# Patient Record
Sex: FEMALE | Race: WHITE | Employment: UNEMPLOYED | URBAN - NONMETROPOLITAN AREA
[De-identification: names, ages, dates, MRNs, and addresses within clinical notes are randomized per-mention and may not be internally consistent; named-entity substitution may affect disease eponyms.]

---

## 2017-02-17 ENCOUNTER — HOSPITAL ENCOUNTER (OUTPATIENT)
Dept: PHARMACY | Age: 27
Setting detail: THERAPIES SERIES
Discharge: HOME | End: 2017-02-17
Payer: MEDICARE

## 2017-02-17 DIAGNOSIS — Z95.811 LVAD (LEFT VENTRICULAR ASSIST DEVICE) PRESENT (HCC): ICD-10-CM

## 2017-02-17 LAB — INR BLD: 2.6

## 2017-02-17 PROCEDURE — G0463 HOSPITAL OUTPT CLINIC VISIT: HCPCS

## 2017-02-17 PROCEDURE — 36416 COLLJ CAPILLARY BLOOD SPEC: CPT

## 2017-02-17 PROCEDURE — 85610 PROTHROMBIN TIME: CPT

## 2017-02-25 ENCOUNTER — HOSPITAL ENCOUNTER (OUTPATIENT)
Dept: PHARMACY | Age: 27
Setting detail: THERAPIES SERIES
Discharge: HOME | End: 2017-02-25
Payer: MEDICARE

## 2017-02-25 DIAGNOSIS — Z95.811 LVAD (LEFT VENTRICULAR ASSIST DEVICE) PRESENT (HCC): ICD-10-CM

## 2017-02-25 LAB — INR BLD: 3.9

## 2017-02-25 PROCEDURE — 36416 COLLJ CAPILLARY BLOOD SPEC: CPT

## 2017-02-25 PROCEDURE — G0463 HOSPITAL OUTPT CLINIC VISIT: HCPCS

## 2017-02-25 PROCEDURE — 85610 PROTHROMBIN TIME: CPT

## 2017-03-02 ENCOUNTER — APPOINTMENT (OUTPATIENT)
Dept: PHARMACY | Age: 27
End: 2017-03-02
Payer: MEDICARE

## 2017-03-02 ENCOUNTER — HOSPITAL ENCOUNTER (OUTPATIENT)
Dept: PHARMACY | Age: 27
Setting detail: THERAPIES SERIES
Discharge: HOME | End: 2017-03-02
Payer: MEDICARE

## 2017-03-02 DIAGNOSIS — Z95.811 LVAD (LEFT VENTRICULAR ASSIST DEVICE) PRESENT (HCC): ICD-10-CM

## 2017-03-02 LAB — INR BLD: 2.4

## 2017-03-02 PROCEDURE — G0463 HOSPITAL OUTPT CLINIC VISIT: HCPCS

## 2017-03-02 PROCEDURE — 36416 COLLJ CAPILLARY BLOOD SPEC: CPT

## 2017-03-02 PROCEDURE — 85610 PROTHROMBIN TIME: CPT

## 2017-03-23 ENCOUNTER — TELEPHONE (OUTPATIENT)
Dept: PHARMACY | Age: 27
End: 2017-03-23

## 2017-03-28 DIAGNOSIS — Z86.711 HISTORY OF PULMONARY EMBOLUS (PE): ICD-10-CM

## 2017-03-28 RX ORDER — WARFARIN SODIUM 4 MG/1
TABLET ORAL
Qty: 60 TABLET | Refills: 1 | OUTPATIENT
Start: 2017-03-28

## 2017-03-29 ENCOUNTER — HOSPITAL ENCOUNTER (OUTPATIENT)
Dept: PHARMACY | Age: 27
Setting detail: THERAPIES SERIES
Discharge: HOME | End: 2017-03-29
Payer: MEDICARE

## 2017-03-29 DIAGNOSIS — Z95.811 LVAD (LEFT VENTRICULAR ASSIST DEVICE) PRESENT (HCC): ICD-10-CM

## 2017-03-29 LAB — INR BLD: 1.4

## 2017-03-29 PROCEDURE — 85610 PROTHROMBIN TIME: CPT

## 2017-03-29 PROCEDURE — G0463 HOSPITAL OUTPT CLINIC VISIT: HCPCS

## 2017-03-29 PROCEDURE — 36416 COLLJ CAPILLARY BLOOD SPEC: CPT

## 2017-04-03 ENCOUNTER — TELEPHONE (OUTPATIENT)
Dept: FAMILY MEDICINE CLINIC | Age: 27
End: 2017-04-03

## 2017-04-12 ENCOUNTER — TELEPHONE (OUTPATIENT)
Dept: PHARMACY | Age: 27
End: 2017-04-12

## 2017-04-13 ENCOUNTER — HOSPITAL ENCOUNTER (OUTPATIENT)
Dept: PHARMACY | Age: 27
Setting detail: THERAPIES SERIES
Discharge: HOME | End: 2017-04-13
Payer: MEDICARE

## 2017-04-13 DIAGNOSIS — Z95.811 LVAD (LEFT VENTRICULAR ASSIST DEVICE) PRESENT (HCC): ICD-10-CM

## 2017-04-13 LAB — INR BLD: 1.9

## 2017-04-13 PROCEDURE — G0463 HOSPITAL OUTPT CLINIC VISIT: HCPCS

## 2017-04-13 PROCEDURE — 85610 PROTHROMBIN TIME: CPT

## 2017-04-13 PROCEDURE — 36416 COLLJ CAPILLARY BLOOD SPEC: CPT

## 2017-04-20 ENCOUNTER — HOSPITAL ENCOUNTER (OUTPATIENT)
Dept: PHARMACY | Age: 27
Setting detail: THERAPIES SERIES
Discharge: HOME | End: 2017-04-20
Payer: MEDICARE

## 2017-04-20 DIAGNOSIS — Z95.811 LVAD (LEFT VENTRICULAR ASSIST DEVICE) PRESENT (HCC): ICD-10-CM

## 2017-04-20 LAB — INR BLD: 3.5

## 2017-04-20 PROCEDURE — 85610 PROTHROMBIN TIME: CPT

## 2017-04-20 PROCEDURE — G0463 HOSPITAL OUTPT CLINIC VISIT: HCPCS

## 2017-04-20 PROCEDURE — 36416 COLLJ CAPILLARY BLOOD SPEC: CPT

## 2017-04-25 ENCOUNTER — HOSPITAL ENCOUNTER (OUTPATIENT)
Dept: PHARMACY | Age: 27
Setting detail: THERAPIES SERIES
Discharge: HOME | End: 2017-04-25
Payer: MEDICARE

## 2017-04-25 DIAGNOSIS — Z95.811 LVAD (LEFT VENTRICULAR ASSIST DEVICE) PRESENT (HCC): ICD-10-CM

## 2017-04-25 LAB — INR BLD: 3.1

## 2017-04-25 PROCEDURE — 36416 COLLJ CAPILLARY BLOOD SPEC: CPT

## 2017-04-25 PROCEDURE — G0463 HOSPITAL OUTPT CLINIC VISIT: HCPCS

## 2017-04-25 PROCEDURE — 85610 PROTHROMBIN TIME: CPT

## 2017-04-27 RX ORDER — NORTRIPTYLINE HYDROCHLORIDE 10 MG/1
CAPSULE ORAL
Qty: 30 CAPSULE | Refills: 0 | OUTPATIENT
Start: 2017-04-27

## 2017-05-02 ENCOUNTER — HOSPITAL ENCOUNTER (OUTPATIENT)
Dept: PHARMACY | Age: 27
Setting detail: THERAPIES SERIES
Discharge: HOME | End: 2017-05-02
Payer: MEDICARE

## 2017-05-02 DIAGNOSIS — F32.A DEPRESSION, UNSPECIFIED DEPRESSION TYPE: Primary | ICD-10-CM

## 2017-05-02 DIAGNOSIS — Z95.811 LVAD (LEFT VENTRICULAR ASSIST DEVICE) PRESENT (HCC): ICD-10-CM

## 2017-05-02 LAB — INR BLD: 1.3

## 2017-05-02 PROCEDURE — 85610 PROTHROMBIN TIME: CPT

## 2017-05-02 PROCEDURE — 99211 OFF/OP EST MAY X REQ PHY/QHP: CPT

## 2017-05-02 PROCEDURE — 36416 COLLJ CAPILLARY BLOOD SPEC: CPT

## 2017-05-02 RX ORDER — NORTRIPTYLINE HYDROCHLORIDE 25 MG/1
25 CAPSULE ORAL NIGHTLY
Qty: 30 CAPSULE | Refills: 0 | Status: SHIPPED | OUTPATIENT
Start: 2017-05-02 | End: 2018-05-21 | Stop reason: SDUPTHER

## 2017-05-10 ENCOUNTER — TELEPHONE (OUTPATIENT)
Dept: PHARMACY | Age: 27
End: 2017-05-10

## 2017-05-10 ENCOUNTER — HOSPITAL ENCOUNTER (OUTPATIENT)
Dept: PHARMACY | Age: 27
Discharge: HOME | End: 2017-05-10

## 2017-05-10 LAB — INR BLD: 2

## 2017-05-18 ENCOUNTER — HOSPITAL ENCOUNTER (OUTPATIENT)
Dept: PHARMACY | Age: 27
Setting detail: THERAPIES SERIES
Discharge: HOME | End: 2017-05-18
Payer: MEDICARE

## 2017-05-18 DIAGNOSIS — Z95.811 LVAD (LEFT VENTRICULAR ASSIST DEVICE) PRESENT (HCC): ICD-10-CM

## 2017-05-18 LAB — INR BLD: 1.7

## 2017-05-18 PROCEDURE — 36416 COLLJ CAPILLARY BLOOD SPEC: CPT

## 2017-05-18 PROCEDURE — 85610 PROTHROMBIN TIME: CPT

## 2017-05-18 PROCEDURE — 99211 OFF/OP EST MAY X REQ PHY/QHP: CPT

## 2017-05-31 ENCOUNTER — HOSPITAL ENCOUNTER (OUTPATIENT)
Dept: PHARMACY | Age: 27
Setting detail: THERAPIES SERIES
Discharge: HOME | End: 2017-05-31
Payer: MEDICARE

## 2017-05-31 LAB — INR BLD: 1.3

## 2017-05-31 PROCEDURE — 36416 COLLJ CAPILLARY BLOOD SPEC: CPT

## 2017-05-31 PROCEDURE — 85610 PROTHROMBIN TIME: CPT

## 2017-05-31 PROCEDURE — 99211 OFF/OP EST MAY X REQ PHY/QHP: CPT

## 2017-06-06 ENCOUNTER — HOSPITAL ENCOUNTER (OUTPATIENT)
Dept: PHARMACY | Age: 27
Setting detail: THERAPIES SERIES
Discharge: HOME | End: 2017-06-06
Payer: MEDICARE

## 2017-06-06 LAB — INR BLD: 2.7

## 2017-06-06 PROCEDURE — 36416 COLLJ CAPILLARY BLOOD SPEC: CPT

## 2017-06-06 PROCEDURE — 99211 OFF/OP EST MAY X REQ PHY/QHP: CPT

## 2017-06-06 PROCEDURE — 85610 PROTHROMBIN TIME: CPT

## 2017-06-14 DIAGNOSIS — Z95.811 LVAD (LEFT VENTRICULAR ASSIST DEVICE) PRESENT (HCC): ICD-10-CM

## 2017-06-14 RX ORDER — WARFARIN SODIUM 4 MG/1
TABLET ORAL
Qty: 60 TABLET | Refills: 1 | OUTPATIENT
Start: 2017-06-14

## 2017-06-16 ENCOUNTER — HOSPITAL ENCOUNTER (OUTPATIENT)
Dept: PHARMACY | Age: 27
Setting detail: THERAPIES SERIES
Discharge: HOME | End: 2017-06-16
Payer: MEDICARE

## 2017-06-16 DIAGNOSIS — Z95.811 LVAD (LEFT VENTRICULAR ASSIST DEVICE) PRESENT (HCC): ICD-10-CM

## 2017-06-16 LAB — INR BLD: 2

## 2017-06-16 PROCEDURE — 99211 OFF/OP EST MAY X REQ PHY/QHP: CPT

## 2017-06-16 PROCEDURE — 36416 COLLJ CAPILLARY BLOOD SPEC: CPT

## 2017-06-16 PROCEDURE — 85610 PROTHROMBIN TIME: CPT

## 2017-06-22 ENCOUNTER — HOSPITAL ENCOUNTER (OUTPATIENT)
Dept: PHARMACY | Age: 27
Setting detail: THERAPIES SERIES
Discharge: HOME | End: 2017-06-22
Payer: MEDICARE

## 2017-06-22 DIAGNOSIS — Z95.811 LVAD (LEFT VENTRICULAR ASSIST DEVICE) PRESENT (HCC): ICD-10-CM

## 2017-06-22 LAB — INR BLD: 3.1

## 2017-06-22 PROCEDURE — 99211 OFF/OP EST MAY X REQ PHY/QHP: CPT

## 2017-06-22 PROCEDURE — 36416 COLLJ CAPILLARY BLOOD SPEC: CPT

## 2017-06-22 PROCEDURE — 85610 PROTHROMBIN TIME: CPT

## 2017-07-31 DIAGNOSIS — Z20.818 STREP THROAT EXPOSURE: Primary | ICD-10-CM

## 2017-07-31 RX ORDER — AMOXICILLIN 500 MG/1
500 CAPSULE ORAL 3 TIMES DAILY
Qty: 30 CAPSULE | Refills: 0 | Status: SHIPPED | OUTPATIENT
Start: 2017-07-31 | End: 2017-08-10

## 2017-08-01 ENCOUNTER — TELEPHONE (OUTPATIENT)
Dept: FAMILY MEDICINE CLINIC | Age: 27
End: 2017-08-01

## 2017-08-01 ENCOUNTER — TELEPHONE (OUTPATIENT)
Dept: PHARMACY | Age: 27
End: 2017-08-01

## 2017-08-03 ENCOUNTER — HOSPITAL ENCOUNTER (OUTPATIENT)
Dept: PHARMACY | Age: 27
Setting detail: THERAPIES SERIES
Discharge: HOME | End: 2017-08-03
Payer: MEDICARE

## 2017-08-03 DIAGNOSIS — Z95.811 LVAD (LEFT VENTRICULAR ASSIST DEVICE) PRESENT (HCC): ICD-10-CM

## 2017-08-03 LAB — INR BLD: 1.3

## 2017-08-03 PROCEDURE — 36416 COLLJ CAPILLARY BLOOD SPEC: CPT

## 2017-08-03 PROCEDURE — 85610 PROTHROMBIN TIME: CPT

## 2017-08-03 PROCEDURE — 99211 OFF/OP EST MAY X REQ PHY/QHP: CPT

## 2017-08-11 ENCOUNTER — TELEPHONE (OUTPATIENT)
Dept: FAMILY MEDICINE CLINIC | Age: 27
End: 2017-08-11

## 2017-08-17 DIAGNOSIS — Z95.811 LVAD (LEFT VENTRICULAR ASSIST DEVICE) PRESENT (HCC): ICD-10-CM

## 2017-08-17 RX ORDER — WARFARIN SODIUM 4 MG/1
TABLET ORAL
Qty: 60 TABLET | Refills: 1 | Status: SHIPPED | OUTPATIENT
Start: 2017-08-17 | End: 2018-08-29 | Stop reason: SDUPTHER

## 2017-08-29 ENCOUNTER — APPOINTMENT (OUTPATIENT)
Dept: GENERAL RADIOLOGY | Age: 27
End: 2017-08-29
Payer: MEDICARE

## 2017-08-29 ENCOUNTER — HOSPITAL ENCOUNTER (EMERGENCY)
Age: 27
Discharge: HOME | End: 2017-08-29
Attending: EMERGENCY MEDICINE
Payer: MEDICARE

## 2017-08-29 VITALS
RESPIRATION RATE: 12 BRPM | BODY MASS INDEX: 19.57 KG/M2 | OXYGEN SATURATION: 96 % | TEMPERATURE: 97.9 F | DIASTOLIC BLOOD PRESSURE: 60 MMHG | SYSTOLIC BLOOD PRESSURE: 96 MMHG | HEART RATE: 100 BPM | WEIGHT: 107 LBS

## 2017-08-29 DIAGNOSIS — Z45.018 ENCOUNTER FOR INTERROGATION OF CARDIAC PACEMAKER: Primary | ICD-10-CM

## 2017-08-29 LAB
ABSOLUTE EOS #: 0.1 K/UL (ref 0–0.4)
ABSOLUTE LYMPH #: 1.2 K/UL (ref 1–4.8)
ABSOLUTE MONO #: 0.3 K/UL (ref 0.1–1.2)
ANION GAP SERPL CALCULATED.3IONS-SCNC: 12 MMOL/L (ref 9–17)
BASOPHILS # BLD: 1 %
BASOPHILS ABSOLUTE: 0 K/UL (ref 0–0.2)
BUN BLDV-MCNC: 9 MG/DL (ref 6–20)
BUN/CREAT BLD: 20 (ref 9–20)
CALCIUM SERPL-MCNC: 9.5 MG/DL (ref 8.6–10.4)
CHLORIDE BLD-SCNC: 103 MMOL/L (ref 98–107)
CO2: 27 MMOL/L (ref 20–31)
CREAT SERPL-MCNC: 0.45 MG/DL (ref 0.5–0.9)
DIFFERENTIAL TYPE: ABNORMAL
EKG ATRIAL RATE: 100 BPM
EKG P-R INTERVAL: 208 MS
EKG Q-T INTERVAL: 394 MS
EKG QRS DURATION: 178 MS
EKG QTC CALCULATION (BAZETT): 508 MS
EKG R AXIS: -153 DEGREES
EKG T AXIS: 60 DEGREES
EKG VENTRICULAR RATE: 100 BPM
EOSINOPHILS RELATIVE PERCENT: 1 %
GFR AFRICAN AMERICAN: >60 ML/MIN
GFR NON-AFRICAN AMERICAN: >60 ML/MIN
GFR SERPL CREATININE-BSD FRML MDRD: ABNORMAL ML/MIN/{1.73_M2}
GFR SERPL CREATININE-BSD FRML MDRD: ABNORMAL ML/MIN/{1.73_M2}
GLUCOSE BLD-MCNC: 93 MG/DL (ref 70–99)
HCT VFR BLD CALC: 39.3 % (ref 36–46)
HEMOGLOBIN: 12.9 G/DL (ref 12–16)
INR BLD: 1.2
LYMPHOCYTES # BLD: 26 %
MCH RBC QN AUTO: 28.7 PG (ref 26–34)
MCHC RBC AUTO-ENTMCNC: 32.7 G/DL (ref 31–37)
MCV RBC AUTO: 87.6 FL (ref 80–100)
MONOCYTES # BLD: 8 %
PDW BLD-RTO: 14.6 % (ref 11–14.5)
PLATELET # BLD: 123 K/UL (ref 140–450)
PLATELET ESTIMATE: ABNORMAL
PMV BLD AUTO: 8.4 FL (ref 6–12)
POTASSIUM SERPL-SCNC: 4 MMOL/L (ref 3.7–5.3)
PROTHROMBIN TIME: 12.9 SEC (ref 9.4–11.3)
RBC # BLD: 4.48 M/UL (ref 4–5.2)
RBC # BLD: ABNORMAL 10*6/UL
SEG NEUTROPHILS: 64 %
SEGMENTED NEUTROPHILS ABSOLUTE COUNT: 2.9 K/UL (ref 1.8–7.7)
SODIUM BLD-SCNC: 142 MMOL/L (ref 135–144)
WBC # BLD: 4.5 K/UL (ref 3.5–11)
WBC # BLD: ABNORMAL 10*3/UL

## 2017-08-29 PROCEDURE — 85025 COMPLETE CBC W/AUTO DIFF WBC: CPT

## 2017-08-29 PROCEDURE — 36415 COLL VENOUS BLD VENIPUNCTURE: CPT

## 2017-08-29 PROCEDURE — 99284 EMERGENCY DEPT VISIT MOD MDM: CPT

## 2017-08-29 PROCEDURE — 85610 PROTHROMBIN TIME: CPT

## 2017-08-29 PROCEDURE — 80048 BASIC METABOLIC PNL TOTAL CA: CPT

## 2017-08-29 PROCEDURE — 71020 XR CHEST STANDARD TWO VW: CPT

## 2017-08-29 PROCEDURE — 93005 ELECTROCARDIOGRAM TRACING: CPT

## 2017-08-29 ASSESSMENT — ENCOUNTER SYMPTOMS
SHORTNESS OF BREATH: 0
CONSTIPATION: 0
COUGH: 0
DIARRHEA: 0
BACK PAIN: 0
EYE PAIN: 0
BLOOD IN STOOL: 0
ABDOMINAL PAIN: 0
VOMITING: 0
NAUSEA: 0

## 2017-10-13 DIAGNOSIS — I49.01 VENTRICULAR FIBRILLATION (HCC): ICD-10-CM

## 2017-10-13 DIAGNOSIS — I47.20 VENTRICULAR TACHYCARDIA: Chronic | ICD-10-CM

## 2017-10-13 DIAGNOSIS — I51.9: ICD-10-CM

## 2017-10-13 DIAGNOSIS — Z95.810 AUTOMATIC IMPLANTABLE CARDIOVERTER-DEFIBRILLATOR IN SITU: Primary | ICD-10-CM

## 2017-10-13 DIAGNOSIS — Z95.810 AUTOMATIC IMPLANTABLE CARDIOVERTER-DEFIBRILLATOR IN SITU: ICD-10-CM

## 2017-10-13 DIAGNOSIS — Q21.3 TOF (TETRALOGY OF FALLOT): Chronic | ICD-10-CM

## 2017-10-30 ENCOUNTER — TELEPHONE (OUTPATIENT)
Dept: FAMILY MEDICINE CLINIC | Age: 27
End: 2017-10-30

## 2017-10-30 DIAGNOSIS — R11.10 VOMITING, INTRACTABILITY OF VOMITING NOT SPECIFIED, PRESENCE OF NAUSEA NOT SPECIFIED, UNSPECIFIED VOMITING TYPE: Primary | ICD-10-CM

## 2017-10-30 RX ORDER — ONDANSETRON 8 MG/1
8 TABLET, ORALLY DISINTEGRATING ORAL EVERY 8 HOURS PRN
Qty: 20 TABLET | Refills: 0 | Status: CANCELLED | OUTPATIENT
Start: 2017-10-30

## 2017-10-30 RX ORDER — ONDANSETRON 4 MG/1
4 TABLET, ORALLY DISINTEGRATING ORAL EVERY 8 HOURS PRN
Qty: 10 TABLET | Refills: 0 | Status: SHIPPED | OUTPATIENT
Start: 2017-10-30 | End: 2020-06-15

## 2017-10-30 NOTE — TELEPHONE ENCOUNTER
Pt calling stating that she had her defibrillator   replaced 10/23/17 at [facility]. Pt is c/o nausea for the past four days. Pt also states feels hot and cold. Pt denies SOB and chest pain except for the pain at the incision site. Pt is requesting Zofran. Pt uses [facility]. Please call pt at [phone] and advise.

## 2017-10-30 NOTE — TELEPHONE ENCOUNTER
Patient states just got of [facility] yesterday. Was using zofran there. Tried to get hold of doctor at [facility] but he nevers get with them on same day. Asking if you would reconsider. Doesnt feel like coming in.

## 2017-11-15 ENCOUNTER — TELEPHONE (OUTPATIENT)
Dept: PHARMACY | Age: 27
End: 2017-11-15

## 2017-11-16 ENCOUNTER — OFFICE VISIT (OUTPATIENT)
Dept: PRIMARY CARE CLINIC | Age: 27
End: 2017-11-16
Payer: MEDICARE

## 2017-11-16 VITALS
BODY MASS INDEX: 19.73 KG/M2 | TEMPERATURE: 99 F | SYSTOLIC BLOOD PRESSURE: 120 MMHG | HEART RATE: 103 BPM | OXYGEN SATURATION: 99 % | HEIGHT: 62 IN | RESPIRATION RATE: 12 BRPM | DIASTOLIC BLOOD PRESSURE: 60 MMHG | WEIGHT: 107.2 LBS

## 2017-11-16 DIAGNOSIS — J01.90 ACUTE BACTERIAL SINUSITIS: Primary | ICD-10-CM

## 2017-11-16 DIAGNOSIS — J30.9 ALLERGIC RHINITIS, UNSPECIFIED CHRONICITY, UNSPECIFIED SEASONALITY, UNSPECIFIED TRIGGER: ICD-10-CM

## 2017-11-16 DIAGNOSIS — J02.9 SORE THROAT: ICD-10-CM

## 2017-11-16 DIAGNOSIS — B96.89 ACUTE BACTERIAL SINUSITIS: Primary | ICD-10-CM

## 2017-11-16 DIAGNOSIS — R50.9 FEVER, UNSPECIFIED FEVER CAUSE: ICD-10-CM

## 2017-11-16 LAB
INFLUENZA A ANTIBODY: NEGATIVE
INFLUENZA B ANTIBODY: NEGATIVE
S PYO AG THROAT QL: NORMAL

## 2017-11-16 PROCEDURE — G8420 CALC BMI NORM PARAMETERS: HCPCS | Performed by: NURSE PRACTITIONER

## 2017-11-16 PROCEDURE — G8598 ASA/ANTIPLAT THER USED: HCPCS | Performed by: NURSE PRACTITIONER

## 2017-11-16 PROCEDURE — 99213 OFFICE O/P EST LOW 20 MIN: CPT | Performed by: NURSE PRACTITIONER

## 2017-11-16 PROCEDURE — 1036F TOBACCO NON-USER: CPT | Performed by: NURSE PRACTITIONER

## 2017-11-16 PROCEDURE — 87880 STREP A ASSAY W/OPTIC: CPT | Performed by: NURSE PRACTITIONER

## 2017-11-16 PROCEDURE — G8484 FLU IMMUNIZE NO ADMIN: HCPCS | Performed by: NURSE PRACTITIONER

## 2017-11-16 PROCEDURE — G8427 DOCREV CUR MEDS BY ELIG CLIN: HCPCS | Performed by: NURSE PRACTITIONER

## 2017-11-16 PROCEDURE — 87804 INFLUENZA ASSAY W/OPTIC: CPT | Performed by: NURSE PRACTITIONER

## 2017-11-16 RX ORDER — AMOXICILLIN AND CLAVULANATE POTASSIUM 875; 125 MG/1; MG/1
1 TABLET, FILM COATED ORAL 2 TIMES DAILY
Qty: 20 TABLET | Refills: 0 | Status: SHIPPED | OUTPATIENT
Start: 2017-11-16 | End: 2017-11-26

## 2017-11-16 RX ORDER — FLUTICASONE PROPIONATE 50 MCG
2 SPRAY, SUSPENSION (ML) NASAL DAILY
Qty: 1 BOTTLE | Refills: 1 | Status: SHIPPED | OUTPATIENT
Start: 2017-11-16 | End: 2020-04-23

## 2017-11-16 ASSESSMENT — ENCOUNTER SYMPTOMS
SORE THROAT: 1
TROUBLE SWALLOWING: 0
EYES NEGATIVE: 1
SINUS PRESSURE: 1
VOMITING: 0
CHEST TIGHTNESS: 0
ABDOMINAL PAIN: 0
RHINORRHEA: 1
CONSTIPATION: 0
ALLERGIC/IMMUNOLOGIC NEGATIVE: 1
NAUSEA: 1
DIARRHEA: 0
SINUS PAIN: 1
SHORTNESS OF BREATH: 1
COUGH: 1

## 2017-11-16 NOTE — PROGRESS NOTES
[address] [facility]  [address], [address] [place]  [phone]      Madiha Wells is a 32 y.o. female who is c/o of Cough (started a few days ago, taknig OTC meds but now causing a dry cough-cough is so bad she pukes) and Chest Congestion (feels very tight in her chest)      HPI:     HPI Patient in today for an acute visit for symptoms of cough and chest congestion that started a few days ago. States that she has tried OTC robitussin and cough drop and IBU with minimal effect. She states that she feels like the robitussin dry her mouth. Her cough is worse at night. She has a low grade temp today. She states that her mother and daughter has been sick as well. Subjective:      Review of Systems   Constitutional: Negative for activity change, appetite change and fever. HENT: Positive for congestion, ear pain, postnasal drip, rhinorrhea, sinus pain, sinus pressure and sore throat. Negative for ear discharge and trouble swallowing. Facial swelling: bilateral.    Eyes: Negative. Respiratory: Positive for cough (worse at night) and shortness of breath. Negative for chest tightness. Cardiovascular: Negative for chest pain and palpitations. Gastrointestinal: Positive for nausea. Negative for abdominal pain, constipation, diarrhea and vomiting. Endocrine: Negative. Genitourinary: Negative for difficulty urinating and dysuria. Musculoskeletal: Negative. Skin: Negative. Allergic/Immunologic: Negative. Neurological: Positive for headaches. Negative for dizziness and light-headedness. Hematological: Negative. Psychiatric/Behavioral: Negative.         Objective:     Vitals:    11/16/17 1345   BP: 120/60   Site: Right Arm   Position: Sitting   Cuff Size: Medium Adult   Pulse: 103   Resp: 12   Temp: 99 °F (37.2 °C)   TempSrc: Tympanic   SpO2: 99%   Weight: 107 lb 3.2 oz (48.6 kg)   Height: 5' 2.01\" (1.575 m)     Physical Exam   Constitutional: She is oriented to person, place, and time. She appears well-developed and well-nourished. HENT:   Head: Normocephalic and atraumatic. Right Ear: Hearing and external ear normal. A middle ear effusion is present. Left Ear: Hearing and external ear normal. A middle ear effusion is present. Nose: Rhinorrhea present. Right sinus exhibits maxillary sinus tenderness and frontal sinus tenderness. Left sinus exhibits maxillary sinus tenderness and frontal sinus tenderness. Mouth/Throat: Uvula is midline and mucous membranes are normal. Oropharyngeal exudate and posterior oropharyngeal erythema present. Eyes: Conjunctivae and EOM are normal. Pupils are equal, round, and reactive to light. Neck: Normal range of motion. Neck supple. Cardiovascular: Normal rate, regular rhythm, normal heart sounds and intact distal pulses. Pulmonary/Chest: Effort normal and breath sounds normal. No respiratory distress. Abdominal: Soft. Bowel sounds are normal.   Musculoskeletal: Normal range of motion. Neurological: She is alert and oriented to person, place, and time. Skin: Skin is warm and dry. Psychiatric: She has a normal mood and affect. Her behavior is normal. Judgment and thought content normal.       Assessment:      1. Acute bacterial sinusitis  amoxicillin-clavulanate (AUGMENTIN) 875-125 MG per tablet   2. Fever, unspecified fever cause  POCT Influenza A/B    POCT rapid strep A   3. Sore throat     4. Allergic rhinitis, unspecified chronicity, unspecified seasonality, unspecified trigger  fluticasone (FLONASE) 50 MCG/ACT nasal spray     Strep and flu are negative    Plan:      Advised patient to continue supportive care. They also need to increase fluids and rest. Advised that patient can use OTC Tylenol and/or Ibuprofen as needed for discomfort or fever. If patient get significantly worse over the next three days (uncontrolled fever of 101 or higher, respiratory distress. Cammy Schmidt ) they then can call my office for

## 2017-12-27 DIAGNOSIS — I51.9: ICD-10-CM

## 2017-12-27 DIAGNOSIS — Q21.3 TOF (TETRALOGY OF FALLOT): Chronic | ICD-10-CM

## 2017-12-27 DIAGNOSIS — I49.01 VENTRICULAR FIBRILLATION (HCC): ICD-10-CM

## 2017-12-27 DIAGNOSIS — Z95.810 AUTOMATIC IMPLANTABLE CARDIOVERTER-DEFIBRILLATOR IN SITU: ICD-10-CM

## 2017-12-27 DIAGNOSIS — I47.20 VENTRICULAR TACHYCARDIA: Chronic | ICD-10-CM

## 2018-01-04 ENCOUNTER — TELEPHONE (OUTPATIENT)
Dept: PHARMACY | Age: 28
End: 2018-01-04

## 2018-02-02 ENCOUNTER — HOSPITAL ENCOUNTER (OUTPATIENT)
Dept: PHARMACY | Age: 28
Setting detail: THERAPIES SERIES
Discharge: HOME | End: 2018-02-02
Payer: MEDICARE

## 2018-02-02 DIAGNOSIS — Z95.811 LVAD (LEFT VENTRICULAR ASSIST DEVICE) PRESENT (HCC): ICD-10-CM

## 2018-02-02 LAB — INR BLD: 1.9

## 2018-02-02 PROCEDURE — 99211 OFF/OP EST MAY X REQ PHY/QHP: CPT

## 2018-02-02 PROCEDURE — 85610 PROTHROMBIN TIME: CPT

## 2018-02-02 PROCEDURE — 36416 COLLJ CAPILLARY BLOOD SPEC: CPT

## 2018-02-06 ENCOUNTER — HOSPITAL ENCOUNTER (OUTPATIENT)
Dept: PHARMACY | Age: 28
Setting detail: THERAPIES SERIES
Discharge: HOME | End: 2018-02-06
Payer: MEDICARE

## 2018-02-06 DIAGNOSIS — Z95.811 LVAD (LEFT VENTRICULAR ASSIST DEVICE) PRESENT (HCC): ICD-10-CM

## 2018-02-06 LAB — INR BLD: 3.2

## 2018-02-06 PROCEDURE — 99211 OFF/OP EST MAY X REQ PHY/QHP: CPT

## 2018-02-06 PROCEDURE — 85610 PROTHROMBIN TIME: CPT

## 2018-02-06 PROCEDURE — 36416 COLLJ CAPILLARY BLOOD SPEC: CPT

## 2018-02-14 ENCOUNTER — TELEPHONE (OUTPATIENT)
Dept: PHARMACY | Age: 28
End: 2018-02-14

## 2018-02-28 ENCOUNTER — TELEPHONE (OUTPATIENT)
Dept: PHARMACY | Age: 28
End: 2018-02-28

## 2018-03-06 ENCOUNTER — HOSPITAL ENCOUNTER (OUTPATIENT)
Dept: PHARMACY | Age: 28
Setting detail: THERAPIES SERIES
Discharge: HOME | End: 2018-03-06
Payer: MEDICARE

## 2018-03-06 DIAGNOSIS — Z95.811 LVAD (LEFT VENTRICULAR ASSIST DEVICE) PRESENT (HCC): ICD-10-CM

## 2018-03-06 LAB — INR BLD: 2.7

## 2018-03-06 PROCEDURE — 85610 PROTHROMBIN TIME: CPT

## 2018-03-06 PROCEDURE — 36416 COLLJ CAPILLARY BLOOD SPEC: CPT

## 2018-03-06 PROCEDURE — 99211 OFF/OP EST MAY X REQ PHY/QHP: CPT

## 2018-03-28 ENCOUNTER — TELEPHONE (OUTPATIENT)
Dept: PHARMACY | Age: 28
End: 2018-03-28

## 2018-04-20 ENCOUNTER — HOSPITAL ENCOUNTER (OUTPATIENT)
Dept: PHARMACY | Age: 28
Setting detail: THERAPIES SERIES
Discharge: HOME | End: 2018-04-20
Payer: MEDICARE

## 2018-04-20 DIAGNOSIS — Z95.811 LVAD (LEFT VENTRICULAR ASSIST DEVICE) PRESENT (HCC): ICD-10-CM

## 2018-04-20 LAB — INR BLD: 2.9

## 2018-04-20 PROCEDURE — 99211 OFF/OP EST MAY X REQ PHY/QHP: CPT

## 2018-04-20 PROCEDURE — 36416 COLLJ CAPILLARY BLOOD SPEC: CPT

## 2018-04-20 PROCEDURE — 85610 PROTHROMBIN TIME: CPT

## 2018-04-30 DIAGNOSIS — I48.91 ATRIAL FIBRILLATION, UNSPECIFIED TYPE (HCC): ICD-10-CM

## 2018-04-30 DIAGNOSIS — I51.9: ICD-10-CM

## 2018-04-30 DIAGNOSIS — Q21.3 TOF (TETRALOGY OF FALLOT): Chronic | ICD-10-CM

## 2018-04-30 DIAGNOSIS — I49.01 VENTRICULAR FIBRILLATION (HCC): ICD-10-CM

## 2018-04-30 DIAGNOSIS — Z95.810 AUTOMATIC IMPLANTABLE CARDIOVERTER-DEFIBRILLATOR IN SITU: Primary | ICD-10-CM

## 2018-04-30 DIAGNOSIS — I47.20 VENTRICULAR TACHYCARDIA: Chronic | ICD-10-CM

## 2018-04-30 PROCEDURE — 93293 PM PHONE R-STRIP DEVICE EVAL: CPT | Performed by: PEDIATRICS

## 2018-05-15 ENCOUNTER — HOSPITAL ENCOUNTER (OUTPATIENT)
Dept: PHARMACY | Age: 28
Setting detail: THERAPIES SERIES
Discharge: HOME | End: 2018-05-15
Payer: MEDICARE

## 2018-05-15 DIAGNOSIS — Z95.811 LVAD (LEFT VENTRICULAR ASSIST DEVICE) PRESENT (HCC): ICD-10-CM

## 2018-05-15 LAB — INR BLD: 4.5

## 2018-05-15 PROCEDURE — 85610 PROTHROMBIN TIME: CPT

## 2018-05-15 PROCEDURE — 99211 OFF/OP EST MAY X REQ PHY/QHP: CPT

## 2018-05-15 PROCEDURE — 36416 COLLJ CAPILLARY BLOOD SPEC: CPT

## 2018-05-21 ENCOUNTER — OFFICE VISIT (OUTPATIENT)
Dept: FAMILY MEDICINE CLINIC | Age: 28
End: 2018-05-21
Payer: MEDICARE

## 2018-05-21 ENCOUNTER — HOSPITAL ENCOUNTER (OUTPATIENT)
Age: 28
Setting detail: SPECIMEN
Discharge: HOME | End: 2018-05-21
Payer: MEDICARE

## 2018-05-21 VITALS
OXYGEN SATURATION: 96 % | HEIGHT: 63 IN | SYSTOLIC BLOOD PRESSURE: 74 MMHG | DIASTOLIC BLOOD PRESSURE: 50 MMHG | BODY MASS INDEX: 18.96 KG/M2 | WEIGHT: 107 LBS | HEART RATE: 100 BPM

## 2018-05-21 DIAGNOSIS — K12.1 MOUTH ULCER: ICD-10-CM

## 2018-05-21 DIAGNOSIS — Z95.811 LVAD (LEFT VENTRICULAR ASSIST DEVICE) PRESENT (HCC): ICD-10-CM

## 2018-05-21 DIAGNOSIS — F32.A DEPRESSION, UNSPECIFIED DEPRESSION TYPE: ICD-10-CM

## 2018-05-21 DIAGNOSIS — Z11.3 SCREEN FOR STD (SEXUALLY TRANSMITTED DISEASE): ICD-10-CM

## 2018-05-21 DIAGNOSIS — R63.4 WEIGHT LOSS: ICD-10-CM

## 2018-05-21 DIAGNOSIS — F32.A DEPRESSION, UNSPECIFIED DEPRESSION TYPE: Primary | ICD-10-CM

## 2018-05-21 DIAGNOSIS — Z13.31 POSITIVE DEPRESSION SCREENING: ICD-10-CM

## 2018-05-21 DIAGNOSIS — Q21.3 TOF (TETRALOGY OF FALLOT): Chronic | ICD-10-CM

## 2018-05-21 PROCEDURE — G8598 ASA/ANTIPLAT THER USED: HCPCS | Performed by: PHYSICIAN ASSISTANT

## 2018-05-21 PROCEDURE — 87015 SPECIMEN INFECT AGNT CONCNTJ: CPT

## 2018-05-21 PROCEDURE — 87255 GENET VIRUS ISOLATE HSV: CPT

## 2018-05-21 PROCEDURE — 96160 PT-FOCUSED HLTH RISK ASSMT: CPT | Performed by: PHYSICIAN ASSISTANT

## 2018-05-21 PROCEDURE — G8420 CALC BMI NORM PARAMETERS: HCPCS | Performed by: PHYSICIAN ASSISTANT

## 2018-05-21 PROCEDURE — G8427 DOCREV CUR MEDS BY ELIG CLIN: HCPCS | Performed by: PHYSICIAN ASSISTANT

## 2018-05-21 PROCEDURE — G8431 POS CLIN DEPRES SCRN F/U DOC: HCPCS | Performed by: PHYSICIAN ASSISTANT

## 2018-05-21 PROCEDURE — 1036F TOBACCO NON-USER: CPT | Performed by: PHYSICIAN ASSISTANT

## 2018-05-21 PROCEDURE — 99215 OFFICE O/P EST HI 40 MIN: CPT | Performed by: PHYSICIAN ASSISTANT

## 2018-05-21 RX ORDER — DOXYCYCLINE HYCLATE 50 MG/1
324 CAPSULE, GELATIN COATED ORAL
Qty: 30 TABLET | Status: CANCELLED | OUTPATIENT
Start: 2018-05-21

## 2018-05-21 RX ORDER — NORTRIPTYLINE HYDROCHLORIDE 10 MG/1
10 CAPSULE ORAL NIGHTLY
Qty: 30 CAPSULE | Refills: 3 | Status: SHIPPED | OUTPATIENT
Start: 2018-05-21 | End: 2018-08-29 | Stop reason: SDUPTHER

## 2018-05-21 RX ORDER — LACTOSE-REDUCED FOOD 0.06 G-1.5
1 LIQUID (ML) ORAL 3 TIMES DAILY
Qty: 90 CAN | Refills: 11 | Status: CANCELLED | OUTPATIENT
Start: 2018-05-21 | End: 2019-05-21

## 2018-05-21 RX ORDER — ASPIRIN 325 MG
325 TABLET ORAL DAILY
Qty: 30 TABLET | Status: CANCELLED | OUTPATIENT
Start: 2018-05-21

## 2018-05-21 RX ORDER — METOPROLOL SUCCINATE 25 MG/1
12.5 TABLET, EXTENDED RELEASE ORAL
Qty: 30 TABLET | Refills: 5 | Status: CANCELLED | OUTPATIENT
Start: 2018-05-21

## 2018-05-21 RX ORDER — DOXYCYCLINE HYCLATE 50 MG/1
324 CAPSULE, GELATIN COATED ORAL
Qty: 30 TABLET | Refills: 5 | Status: SHIPPED | OUTPATIENT
Start: 2018-05-21 | End: 2018-10-22 | Stop reason: SDUPTHER

## 2018-05-21 RX ORDER — FOLIC ACID 1 MG/1
1 TABLET ORAL DAILY
Qty: 30 TABLET | Refills: 3 | Status: SHIPPED
Start: 2018-05-21 | End: 2020-04-23 | Stop reason: SDUPTHER

## 2018-05-21 RX ORDER — PANTOPRAZOLE SODIUM 40 MG/1
40 TABLET, DELAYED RELEASE ORAL DAILY
Qty: 30 TABLET | Refills: 5 | Status: SHIPPED | OUTPATIENT
Start: 2018-05-21 | End: 2018-10-22 | Stop reason: SDUPTHER

## 2018-05-21 RX ORDER — PANTOPRAZOLE SODIUM 40 MG/1
40 TABLET, DELAYED RELEASE ORAL DAILY
Qty: 30 TABLET | Status: CANCELLED | OUTPATIENT
Start: 2018-05-21

## 2018-05-21 RX ORDER — FOLIC ACID 1 MG/1
1 TABLET ORAL DAILY
Qty: 30 TABLET | Status: CANCELLED | OUTPATIENT
Start: 2018-05-21

## 2018-05-21 RX ORDER — MAGNESIUM OXIDE 400 MG/1
800 TABLET ORAL DAILY
Qty: 30 TABLET | Status: CANCELLED | OUTPATIENT
Start: 2018-05-21

## 2018-05-21 RX ORDER — MAGNESIUM OXIDE 400 MG/1
800 TABLET ORAL DAILY
Qty: 30 TABLET | Refills: 5 | Status: CANCELLED | OUTPATIENT
Start: 2018-05-21

## 2018-05-21 RX ORDER — MULTIVITAMIN
1 TABLET ORAL DAILY
Qty: 30 TABLET | Refills: 11 | Status: SHIPPED | OUTPATIENT
Start: 2018-05-21 | End: 2019-04-22 | Stop reason: SDUPTHER

## 2018-05-21 RX ORDER — LACTOSE-REDUCED FOOD 0.06 G-1.5
1 LIQUID (ML) ORAL 3 TIMES DAILY
Qty: 90 CAN | Refills: 11 | Status: SHIPPED | OUTPATIENT
Start: 2018-05-21 | End: 2020-06-15

## 2018-05-21 RX ORDER — FUROSEMIDE 20 MG/1
20 TABLET ORAL PRN
Qty: 30 TABLET | Refills: 5 | Status: CANCELLED | OUTPATIENT
Start: 2018-05-21

## 2018-05-21 RX ORDER — MULTIVITAMIN
TABLET ORAL
Refills: 0 | Status: CANCELLED | OUTPATIENT
Start: 2018-05-21

## 2018-05-21 RX ORDER — WARFARIN SODIUM 4 MG/1
TABLET ORAL
Qty: 60 TABLET | Refills: 5 | Status: CANCELLED | OUTPATIENT
Start: 2018-05-21

## 2018-05-21 RX ORDER — NORTRIPTYLINE HYDROCHLORIDE 25 MG/1
25 CAPSULE ORAL NIGHTLY
Qty: 30 CAPSULE | Refills: 0 | Status: CANCELLED | OUTPATIENT
Start: 2018-05-21

## 2018-05-21 ASSESSMENT — PATIENT HEALTH QUESTIONNAIRE - PHQ9
9. THOUGHTS THAT YOU WOULD BE BETTER OFF DEAD, OR OF HURTING YOURSELF: 0
5. POOR APPETITE OR OVEREATING: 3
3. TROUBLE FALLING OR STAYING ASLEEP: 3
SUM OF ALL RESPONSES TO PHQ QUESTIONS 1-9: 21
6. FEELING BAD ABOUT YOURSELF - OR THAT YOU ARE A FAILURE OR HAVE LET YOURSELF OR YOUR FAMILY DOWN: 3
10. IF YOU CHECKED OFF ANY PROBLEMS, HOW DIFFICULT HAVE THESE PROBLEMS MADE IT FOR YOU TO DO YOUR WORK, TAKE CARE OF THINGS AT HOME, OR GET ALONG WITH OTHER PEOPLE: 0
8. MOVING OR SPEAKING SO SLOWLY THAT OTHER PEOPLE COULD HAVE NOTICED. OR THE OPPOSITE, BEING SO FIGETY OR RESTLESS THAT YOU HAVE BEEN MOVING AROUND A LOT MORE THAN USUAL: 3
7. TROUBLE CONCENTRATING ON THINGS, SUCH AS READING THE NEWSPAPER OR WATCHING TELEVISION: 3
4. FEELING TIRED OR HAVING LITTLE ENERGY: 3
2. FEELING DOWN, DEPRESSED OR HOPELESS: 1
SUM OF ALL RESPONSES TO PHQ9 QUESTIONS 1 & 2: 3
1. LITTLE INTEREST OR PLEASURE IN DOING THINGS: 2

## 2018-05-25 LAB
CULTURE: NORMAL
Lab: NORMAL
SPECIMEN DESCRIPTION: NORMAL
SPECIMEN DESCRIPTION: NORMAL
STATUS: NORMAL

## 2018-05-30 ENCOUNTER — TELEPHONE (OUTPATIENT)
Dept: PRIMARY CARE CLINIC | Age: 28
End: 2018-05-30

## 2018-06-08 RX ORDER — MAGNESIUM OXIDE 400 MG/1
800 TABLET ORAL DAILY
Qty: 60 TABLET | OUTPATIENT
Start: 2018-06-08

## 2018-06-26 ENCOUNTER — TELEPHONE (OUTPATIENT)
Dept: FAMILY MEDICINE CLINIC | Age: 28
End: 2018-06-26

## 2018-07-31 ENCOUNTER — HOSPITAL ENCOUNTER (OUTPATIENT)
Dept: PHARMACY | Age: 28
Setting detail: THERAPIES SERIES
Discharge: HOME | End: 2018-07-31
Payer: MEDICARE

## 2018-07-31 DIAGNOSIS — Z95.811 LVAD (LEFT VENTRICULAR ASSIST DEVICE) PRESENT (HCC): ICD-10-CM

## 2018-07-31 LAB — INR BLD: 3

## 2018-07-31 PROCEDURE — 85610 PROTHROMBIN TIME: CPT

## 2018-07-31 PROCEDURE — 36416 COLLJ CAPILLARY BLOOD SPEC: CPT

## 2018-07-31 PROCEDURE — 99211 OFF/OP EST MAY X REQ PHY/QHP: CPT

## 2018-08-09 ENCOUNTER — TELEPHONE (OUTPATIENT)
Dept: PRIMARY CARE CLINIC | Age: 28
End: 2018-08-09

## 2018-08-21 ENCOUNTER — HOSPITAL ENCOUNTER (OUTPATIENT)
Dept: PHARMACY | Age: 28
Setting detail: THERAPIES SERIES
Discharge: HOME | End: 2018-08-21
Payer: MEDICARE

## 2018-08-21 DIAGNOSIS — Z95.811 LVAD (LEFT VENTRICULAR ASSIST DEVICE) PRESENT (HCC): ICD-10-CM

## 2018-08-21 LAB — INR BLD: 1.4

## 2018-08-21 PROCEDURE — 36416 COLLJ CAPILLARY BLOOD SPEC: CPT

## 2018-08-21 PROCEDURE — 85610 PROTHROMBIN TIME: CPT

## 2018-08-21 PROCEDURE — 99211 OFF/OP EST MAY X REQ PHY/QHP: CPT

## 2018-08-29 DIAGNOSIS — F32.A DEPRESSION, UNSPECIFIED DEPRESSION TYPE: ICD-10-CM

## 2018-08-29 DIAGNOSIS — Z95.811 LVAD (LEFT VENTRICULAR ASSIST DEVICE) PRESENT (HCC): ICD-10-CM

## 2018-08-29 RX ORDER — WARFARIN SODIUM 4 MG/1
TABLET ORAL
Qty: 60 TABLET | Refills: 1 | Status: SHIPPED | OUTPATIENT
Start: 2018-08-29 | End: 2018-10-22 | Stop reason: SDUPTHER

## 2018-08-29 RX ORDER — NORTRIPTYLINE HYDROCHLORIDE 10 MG/1
10 CAPSULE ORAL NIGHTLY
Qty: 30 CAPSULE | Refills: 3 | Status: SHIPPED | OUTPATIENT
Start: 2018-08-29 | End: 2018-12-20 | Stop reason: SDUPTHER

## 2018-08-29 RX ORDER — FOLIC ACID 1 MG/1
1 TABLET ORAL DAILY
Qty: 30 TABLET | Refills: 3 | Status: SHIPPED | OUTPATIENT
Start: 2018-08-29 | End: 2018-12-20 | Stop reason: SDUPTHER

## 2018-09-05 ENCOUNTER — TELEPHONE (OUTPATIENT)
Dept: PHARMACY | Age: 28
End: 2018-09-05

## 2018-09-05 LAB
ALBUMIN SERPL-MCNC: NORMAL G/DL
ALP BLD-CCNC: NORMAL U/L
ALT SERPL-CCNC: NORMAL U/L
ANION GAP SERPL CALCULATED.3IONS-SCNC: NORMAL MMOL/L
AST SERPL-CCNC: NORMAL U/L
BILIRUB SERPL-MCNC: NORMAL MG/DL (ref 0.1–1.4)
BUN BLDV-MCNC: 10 MG/DL
CALCIUM SERPL-MCNC: NORMAL MG/DL
CHLORIDE BLD-SCNC: 106 MMOL/L
CO2: 23 MMOL/L
CREAT SERPL-MCNC: 0.49 MG/DL
GFR CALCULATED: NORMAL
GLUCOSE BLD-MCNC: 91 MG/DL
INR BLD: 3.3
POTASSIUM SERPL-SCNC: 4.2 MMOL/L
PROTIME: 34 SECONDS
SODIUM BLD-SCNC: 137 MMOL/L
TOTAL PROTEIN: NORMAL

## 2018-09-07 ENCOUNTER — HOSPITAL ENCOUNTER (OUTPATIENT)
Dept: LAB | Age: 28
Setting detail: SPECIMEN
Discharge: HOME | End: 2018-09-07
Payer: MEDICARE

## 2018-09-07 ENCOUNTER — OFFICE VISIT (OUTPATIENT)
Dept: FAMILY MEDICINE CLINIC | Age: 28
End: 2018-09-07
Payer: MEDICARE

## 2018-09-07 VITALS
WEIGHT: 106 LBS | HEART RATE: 98 BPM | OXYGEN SATURATION: 98 % | DIASTOLIC BLOOD PRESSURE: 50 MMHG | SYSTOLIC BLOOD PRESSURE: 70 MMHG | HEIGHT: 62 IN | RESPIRATION RATE: 16 BRPM | BODY MASS INDEX: 19.51 KG/M2

## 2018-09-07 DIAGNOSIS — Z12.4 CERVICAL CANCER SCREENING: ICD-10-CM

## 2018-09-07 DIAGNOSIS — Z23 NEED FOR INFLUENZA VACCINATION: ICD-10-CM

## 2018-09-07 DIAGNOSIS — Z11.3 SCREEN FOR STD (SEXUALLY TRANSMITTED DISEASE): ICD-10-CM

## 2018-09-07 DIAGNOSIS — R51.9 CHRONIC NONINTRACTABLE HEADACHE, UNSPECIFIED HEADACHE TYPE: ICD-10-CM

## 2018-09-07 DIAGNOSIS — G89.29 CHRONIC NONINTRACTABLE HEADACHE, UNSPECIFIED HEADACHE TYPE: ICD-10-CM

## 2018-09-07 DIAGNOSIS — F32.A DEPRESSION, UNSPECIFIED DEPRESSION TYPE: Primary | ICD-10-CM

## 2018-09-07 PROCEDURE — 87389 HIV-1 AG W/HIV-1&-2 AB AG IA: CPT

## 2018-09-07 PROCEDURE — G8598 ASA/ANTIPLAT THER USED: HCPCS | Performed by: PHYSICIAN ASSISTANT

## 2018-09-07 PROCEDURE — 1036F TOBACCO NON-USER: CPT | Performed by: PHYSICIAN ASSISTANT

## 2018-09-07 PROCEDURE — 90471 IMMUNIZATION ADMIN: CPT | Performed by: PHYSICIAN ASSISTANT

## 2018-09-07 PROCEDURE — 86780 TREPONEMA PALLIDUM: CPT

## 2018-09-07 PROCEDURE — 99214 OFFICE O/P EST MOD 30 MIN: CPT | Performed by: PHYSICIAN ASSISTANT

## 2018-09-07 PROCEDURE — G8420 CALC BMI NORM PARAMETERS: HCPCS | Performed by: PHYSICIAN ASSISTANT

## 2018-09-07 PROCEDURE — G8427 DOCREV CUR MEDS BY ELIG CLIN: HCPCS | Performed by: PHYSICIAN ASSISTANT

## 2018-09-07 PROCEDURE — 87591 N.GONORRHOEAE DNA AMP PROB: CPT

## 2018-09-07 PROCEDURE — 36415 COLL VENOUS BLD VENIPUNCTURE: CPT

## 2018-09-07 PROCEDURE — 87491 CHLMYD TRACH DNA AMP PROBE: CPT

## 2018-09-07 PROCEDURE — 80074 ACUTE HEPATITIS PANEL: CPT

## 2018-09-07 PROCEDURE — 90686 IIV4 VACC NO PRSV 0.5 ML IM: CPT | Performed by: PHYSICIAN ASSISTANT

## 2018-09-08 LAB
HAV IGM SER IA-ACNC: ABNORMAL
HEPATITIS B CORE IGM ANTIBODY: NONREACTIVE
HEPATITIS B SURFACE ANTIGEN: NONREACTIVE
HEPATITIS C ANTIBODY: NONREACTIVE
HIV AG/AB: NONREACTIVE
T. PALLIDUM, IGG: NONREACTIVE

## 2018-09-09 ASSESSMENT — ENCOUNTER SYMPTOMS: BLURRED VISION: 0

## 2018-09-09 NOTE — PROGRESS NOTES
Subjective:      Patient ID: Sofiya Toney is a 29 y.o. female. Mental Health Problem   The primary symptoms include dysphoric mood. The current episode started more than 1 month ago. This is a chronic problem. The onset of the illness is precipitated by emotional stress. The degree of incapacity that she is experiencing as a consequence of her illness is mild. Additional symptoms of the illness include headaches. Additional symptoms of the illness do not include anhedonia, insomnia or agitation. Headache    This is a chronic problem. The current episode started more than 1 month ago. The pain quality is similar to prior headaches. Associated symptoms include neck pain. Pertinent negatives include no blurred vision or insomnia. Mood has improved. She saw the counselor once. She ended the toxic relationship which has improved her mood significantly. Patient was recently hospitalized at [facility]. She had a syncopal episode and supra therapeutic INR. She admits that she had not eaten for a day and had little to drink [place]. Review of Systems   Eyes: Negative for blurred vision. Musculoskeletal: Positive for neck pain. Neurological: Positive for headaches. Psychiatric/Behavioral: Positive for dysphoric mood. Negative for agitation. The patient does not have insomnia.       Past Medical History:   Diagnosis Date    ACC/AHA stage D heart failure (HCC)     Anxiety     Atrial fibrillation (HCC)     CAD (coronary artery disease)     Cardiomyopathy ([facility] [address] 10/19/2014    Coagulopathy (HCC)     secondary to hepatopathy    Dilated cardiomyopathy ([facility] [address] 3/11/16    Elevated liver enzymes     Hepatomegaly     congestive hepatopathy    Hyperthyroidism     resolved    Hypothyroidism     resolved    LVAD (left ventricular assist device) present ([facility] [address] 04/26/16    Migraine     Nausea & vomiting     related to low output heart failure (treated with Haldol in the past)

## 2018-09-10 ENCOUNTER — TELEPHONE (OUTPATIENT)
Dept: FAMILY MEDICINE CLINIC | Age: 28
End: 2018-09-10

## 2018-09-10 DIAGNOSIS — A74.9 CHLAMYDIA: Primary | ICD-10-CM

## 2018-09-10 LAB
C. TRACHOMATIS DNA ,URINE: ABNORMAL
N. GONORRHOEAE DNA, URINE: NEGATIVE

## 2018-09-10 RX ORDER — AZITHROMYCIN 500 MG/1
1000 TABLET, FILM COATED ORAL DAILY
Qty: 1 PACKET | Refills: 0 | OUTPATIENT
Start: 2018-09-10 | End: 2018-09-11

## 2018-09-10 NOTE — TELEPHONE ENCOUNTER
----- Message from Jose [place]aines [place] sent at 9/10/2018 11:29 AM EDT -----  + chlamydia. Zmax 1 g x 1 dose. Contact coumadin clinic re: antibiotic & repeat INR. Additional labs still pending.

## 2018-09-11 LAB
SEND OUT REPORT: NORMAL
TEST NAME: NORMAL

## 2018-09-14 ENCOUNTER — HOSPITAL ENCOUNTER (OUTPATIENT)
Dept: PHARMACY | Age: 28
Setting detail: THERAPIES SERIES
Discharge: HOME | End: 2018-09-14
Payer: MEDICARE

## 2018-09-14 DIAGNOSIS — Z95.811 LVAD (LEFT VENTRICULAR ASSIST DEVICE) PRESENT (HCC): ICD-10-CM

## 2018-09-14 LAB — INR BLD: 1.6

## 2018-09-14 PROCEDURE — 99211 OFF/OP EST MAY X REQ PHY/QHP: CPT

## 2018-09-14 PROCEDURE — 36416 COLLJ CAPILLARY BLOOD SPEC: CPT

## 2018-09-14 PROCEDURE — 85610 PROTHROMBIN TIME: CPT

## 2018-09-14 NOTE — PROGRESS NOTES
ANTICOAGULATION SERVICE    Date of Clinic Visit:  9/14/2018    Bobo Ferrara is a 29 y.o. female who presents to clinic today for anticoagulation monitoring and adjustment. Recent INR Results:  Internal QC passed  Lab Results   Component Value Date    INR 1.6 09/14/2018    INR 3.3 09/05/2018       Current Warfarin Dosage:  Dosing Plan  As of 9/14/2018    TTR:   32.5 % (2.4 y)   Full warfarin instructions:   6 mg on Tue, Fri; 4 mg all other days               Assessment/Plan:    Modify warfarin dose as noted above: INR low today. Will increase weekly dose 10%. Recheck one week. Next Clinic Appointment:  Return date  As of 9/14/2018    TTR:   32.5 % (2.4 y)   Next INR check:   9/21/2018             Please call [facility] at [phone] with any questions. Thanks!   Elisa Diaz, [facility] [facility]  Anticoagulation Service Pharmacist  9/14/2018 10:29 AM

## 2018-09-21 ENCOUNTER — HOSPITAL ENCOUNTER (OUTPATIENT)
Dept: PHARMACY | Age: 28
Setting detail: THERAPIES SERIES
Discharge: HOME | End: 2018-09-21
Payer: MEDICARE

## 2018-09-21 DIAGNOSIS — Z95.811 LVAD (LEFT VENTRICULAR ASSIST DEVICE) PRESENT (HCC): ICD-10-CM

## 2018-09-21 LAB — INR BLD: 2.7

## 2018-09-21 PROCEDURE — 99211 OFF/OP EST MAY X REQ PHY/QHP: CPT

## 2018-09-21 PROCEDURE — 36416 COLLJ CAPILLARY BLOOD SPEC: CPT

## 2018-09-21 PROCEDURE — 85610 PROTHROMBIN TIME: CPT

## 2018-10-05 ENCOUNTER — HOSPITAL ENCOUNTER (OUTPATIENT)
Dept: PHARMACY | Age: 28
Setting detail: THERAPIES SERIES
Discharge: HOME | End: 2018-10-05
Payer: MEDICARE

## 2018-10-05 DIAGNOSIS — Z95.811 LVAD (LEFT VENTRICULAR ASSIST DEVICE) PRESENT (HCC): ICD-10-CM

## 2018-10-05 LAB — INR BLD: 2.1

## 2018-10-05 PROCEDURE — 36416 COLLJ CAPILLARY BLOOD SPEC: CPT

## 2018-10-05 PROCEDURE — 99211 OFF/OP EST MAY X REQ PHY/QHP: CPT

## 2018-10-05 PROCEDURE — 85610 PROTHROMBIN TIME: CPT

## 2018-10-10 DIAGNOSIS — I47.20 VENTRICULAR TACHYCARDIA: Chronic | ICD-10-CM

## 2018-10-10 DIAGNOSIS — Z95.810 AUTOMATIC IMPLANTABLE CARDIOVERTER-DEFIBRILLATOR IN SITU: ICD-10-CM

## 2018-10-10 DIAGNOSIS — I48.91 ATRIAL FIBRILLATION, UNSPECIFIED TYPE (HCC): ICD-10-CM

## 2018-10-10 DIAGNOSIS — I51.9: ICD-10-CM

## 2018-10-10 DIAGNOSIS — I49.01 VENTRICULAR FIBRILLATION (HCC): ICD-10-CM

## 2018-10-10 DIAGNOSIS — Q21.3 TOF (TETRALOGY OF FALLOT): Chronic | ICD-10-CM

## 2018-10-10 PROCEDURE — 93293 PM PHONE R-STRIP DEVICE EVAL: CPT | Performed by: PEDIATRICS

## 2018-10-22 DIAGNOSIS — Z95.811 LVAD (LEFT VENTRICULAR ASSIST DEVICE) PRESENT (HCC): ICD-10-CM

## 2018-10-22 DIAGNOSIS — Q21.3 TOF (TETRALOGY OF FALLOT): Chronic | ICD-10-CM

## 2018-10-22 RX ORDER — WARFARIN SODIUM 4 MG/1
TABLET ORAL
Qty: 60 TABLET | Refills: 1 | Status: SHIPPED | OUTPATIENT
Start: 2018-10-22 | End: 2018-12-20 | Stop reason: SDUPTHER

## 2018-10-22 RX ORDER — PANTOPRAZOLE SODIUM 40 MG/1
TABLET, DELAYED RELEASE ORAL
Qty: 30 TABLET | Refills: 1 | Status: SHIPPED | OUTPATIENT
Start: 2018-10-22 | End: 2018-12-20 | Stop reason: SDUPTHER

## 2018-10-22 RX ORDER — DOXYCYCLINE HYCLATE 50 MG/1
CAPSULE, GELATIN COATED ORAL
Qty: 30 TABLET | Refills: 1 | Status: SHIPPED | OUTPATIENT
Start: 2018-10-22 | End: 2018-12-20 | Stop reason: SDUPTHER

## 2018-11-07 ENCOUNTER — HOSPITAL ENCOUNTER (OUTPATIENT)
Dept: NON INVASIVE DIAGNOSTICS | Age: 28
Discharge: HOME | End: 2018-11-07
Payer: MEDICARE

## 2018-11-07 PROCEDURE — 93320 DOPPLER ECHO COMPLETE: CPT

## 2018-11-07 PROCEDURE — 93303 ECHO TRANSTHORACIC: CPT

## 2018-11-16 ENCOUNTER — HOSPITAL ENCOUNTER (OUTPATIENT)
Dept: PHARMACY | Age: 28
Setting detail: THERAPIES SERIES
Discharge: HOME | End: 2018-11-16
Payer: MEDICARE

## 2018-11-16 DIAGNOSIS — Z95.811 LVAD (LEFT VENTRICULAR ASSIST DEVICE) PRESENT (HCC): ICD-10-CM

## 2018-11-16 LAB — INR BLD: 2.4

## 2018-11-16 PROCEDURE — 85610 PROTHROMBIN TIME: CPT

## 2018-11-16 PROCEDURE — 36416 COLLJ CAPILLARY BLOOD SPEC: CPT

## 2018-11-16 PROCEDURE — 99211 OFF/OP EST MAY X REQ PHY/QHP: CPT

## 2018-11-16 NOTE — PROGRESS NOTES
ANTICOAGULATION SERVICE    Date of Clinic Visit:  11/16/2018    Gurwinder Fitch is a 29 y.o. female who presents to clinic today for anticoagulation monitoring and adjustment. Recent INR Results:  Internal QC passed  Lab Results   Component Value Date    INR 2.4 11/16/2018    INR 2.1 10/05/2018       Current Warfarin Dosage:  Dosing Plan  As of 11/16/2018    TTR:   36.8 % (2.5 y)   Full warfarin instructions:   6 mg on Tue, Fri; 4 mg all other days               Assessment/Plan:    Continue current regimen as INR remains stable. Next Clinic Appointment:  Return date  As of 11/16/2018    TTR:   36.8 % (2.5 y)   Next INR check:   12/14/2018             Please call [facility] Anticoagulation Clinic at [phone] with any questions. Thanks!   Shayan Solomon, [facility]  Anticoagulation Service Pharmacist  11/16/2018 12:49 PM

## 2018-11-23 DIAGNOSIS — R63.4 WEIGHT LOSS: ICD-10-CM

## 2018-11-26 RX ORDER — LACTOSE-REDUCED FOOD 0.06 G-1.5
1 LIQUID (ML) ORAL 3 TIMES DAILY
Qty: 19197 ML | Refills: 10 | OUTPATIENT
Start: 2018-11-26 | End: 2019-11-26

## 2018-12-20 DIAGNOSIS — Q21.3 TOF (TETRALOGY OF FALLOT): Chronic | ICD-10-CM

## 2018-12-20 DIAGNOSIS — R11.0 NAUSEA: Primary | ICD-10-CM

## 2018-12-20 DIAGNOSIS — F32.A DEPRESSION, UNSPECIFIED DEPRESSION TYPE: ICD-10-CM

## 2018-12-20 DIAGNOSIS — Z95.811 LVAD (LEFT VENTRICULAR ASSIST DEVICE) PRESENT (HCC): ICD-10-CM

## 2018-12-21 RX ORDER — DOXYCYCLINE HYCLATE 50 MG/1
CAPSULE, GELATIN COATED ORAL
Qty: 30 TABLET | Refills: 1 | Status: SHIPPED | OUTPATIENT
Start: 2018-12-21 | End: 2019-03-18 | Stop reason: SDUPTHER

## 2018-12-21 RX ORDER — WARFARIN SODIUM 4 MG/1
TABLET ORAL
Qty: 60 TABLET | Refills: 10 | Status: SHIPPED | OUTPATIENT
Start: 2018-12-21

## 2018-12-21 RX ORDER — PANTOPRAZOLE SODIUM 40 MG/1
TABLET, DELAYED RELEASE ORAL
Qty: 30 TABLET | Refills: 1 | Status: SHIPPED | OUTPATIENT
Start: 2018-12-21 | End: 2019-03-12 | Stop reason: SDUPTHER

## 2018-12-21 RX ORDER — NORTRIPTYLINE HYDROCHLORIDE 10 MG/1
10 CAPSULE ORAL NIGHTLY
Qty: 30 CAPSULE | Refills: 1 | Status: SHIPPED | OUTPATIENT
Start: 2018-12-21 | End: 2019-03-12

## 2018-12-21 RX ORDER — FOLIC ACID 1 MG/1
TABLET ORAL
Qty: 30 TABLET | Refills: 1 | Status: SHIPPED | OUTPATIENT
Start: 2018-12-21 | End: 2019-03-12 | Stop reason: SDUPTHER

## 2019-01-29 DIAGNOSIS — I49.01 VENTRICULAR FIBRILLATION (HCC): ICD-10-CM

## 2019-01-29 DIAGNOSIS — I48.91 ATRIAL FIBRILLATION, UNSPECIFIED TYPE (HCC): ICD-10-CM

## 2019-01-29 DIAGNOSIS — Z95.810 AUTOMATIC IMPLANTABLE CARDIOVERTER-DEFIBRILLATOR IN SITU: ICD-10-CM

## 2019-01-29 DIAGNOSIS — I51.9: ICD-10-CM

## 2019-01-29 DIAGNOSIS — Q21.3 TOF (TETRALOGY OF FALLOT): Chronic | ICD-10-CM

## 2019-01-29 DIAGNOSIS — I47.20 VENTRICULAR TACHYCARDIA: Chronic | ICD-10-CM

## 2019-01-29 PROCEDURE — 93293 PM PHONE R-STRIP DEVICE EVAL: CPT | Performed by: PEDIATRICS

## 2019-02-04 DIAGNOSIS — I42.0 CARDIOMYOPATHY, DILATED, NONISCHEMIC (HCC): Primary | ICD-10-CM

## 2019-02-05 RX ORDER — ASPIRIN 325 MG
325 TABLET ORAL DAILY
Qty: 30 TABLET | Refills: 1 | Status: SHIPPED | OUTPATIENT
Start: 2019-02-05 | End: 2019-03-23 | Stop reason: SDUPTHER

## 2019-02-13 ENCOUNTER — TELEPHONE (OUTPATIENT)
Dept: PHARMACY | Age: 29
End: 2019-02-13

## 2019-03-08 ENCOUNTER — OFFICE VISIT (OUTPATIENT)
Dept: FAMILY MEDICINE CLINIC | Age: 29
End: 2019-03-08
Payer: MEDICARE

## 2019-03-08 ENCOUNTER — HOSPITAL ENCOUNTER (OUTPATIENT)
Age: 29
Setting detail: SPECIMEN
Discharge: HOME | End: 2019-03-08
Payer: MEDICARE

## 2019-03-08 ENCOUNTER — TELEPHONE (OUTPATIENT)
Dept: FAMILY MEDICINE CLINIC | Age: 29
End: 2019-03-08

## 2019-03-08 VITALS
HEART RATE: 80 BPM | OXYGEN SATURATION: 98 % | SYSTOLIC BLOOD PRESSURE: 108 MMHG | DIASTOLIC BLOOD PRESSURE: 70 MMHG | WEIGHT: 110 LBS | BODY MASS INDEX: 18.78 KG/M2 | HEIGHT: 64 IN | TEMPERATURE: 98 F

## 2019-03-08 DIAGNOSIS — M62.838 NECK MUSCLE SPASM: ICD-10-CM

## 2019-03-08 DIAGNOSIS — R82.90 FOUL SMELLING URINE: ICD-10-CM

## 2019-03-08 DIAGNOSIS — J02.9 SORE THROAT: ICD-10-CM

## 2019-03-08 DIAGNOSIS — Z95.811 LVAD (LEFT VENTRICULAR ASSIST DEVICE) PRESENT (HCC): ICD-10-CM

## 2019-03-08 DIAGNOSIS — F32.A DEPRESSION, UNSPECIFIED DEPRESSION TYPE: Primary | ICD-10-CM

## 2019-03-08 DIAGNOSIS — Z20.828 EXPOSURE TO INFLUENZA: Primary | ICD-10-CM

## 2019-03-08 LAB
-: ABNORMAL
AMORPHOUS: ABNORMAL
BACTERIA: ABNORMAL
BILIRUBIN URINE: ABNORMAL
CASTS UA: ABNORMAL /LPF (ref 0–2)
COLOR: ABNORMAL
COMMENT UA: ABNORMAL
CRYSTALS, UA: ABNORMAL /HPF
EPITHELIAL CELLS UA: ABNORMAL /HPF (ref 0–5)
GLUCOSE URINE: NEGATIVE
KETONES, URINE: NEGATIVE
LEUKOCYTE ESTERASE, URINE: NEGATIVE
MUCUS: ABNORMAL
NITRITE, URINE: NEGATIVE
OTHER OBSERVATIONS UA: ABNORMAL
PH UA: 5.5 (ref 5–6)
PROTEIN UA: ABNORMAL
RBC UA: ABNORMAL /HPF (ref 0–4)
RENAL EPITHELIAL, UA: ABNORMAL /HPF
S PYO AG THROAT QL: NORMAL
SPECIFIC GRAVITY UA: 1.03 (ref 1.01–1.02)
TRICHOMONAS: ABNORMAL
TURBIDITY: ABNORMAL
URINE HGB: ABNORMAL
UROBILINOGEN, URINE: NORMAL
WBC UA: ABNORMAL /HPF (ref 0–4)
YEAST: ABNORMAL

## 2019-03-08 PROCEDURE — 87880 STREP A ASSAY W/OPTIC: CPT | Performed by: PHYSICIAN ASSISTANT

## 2019-03-08 PROCEDURE — G8598 ASA/ANTIPLAT THER USED: HCPCS | Performed by: PHYSICIAN ASSISTANT

## 2019-03-08 PROCEDURE — G8482 FLU IMMUNIZE ORDER/ADMIN: HCPCS | Performed by: PHYSICIAN ASSISTANT

## 2019-03-08 PROCEDURE — 1036F TOBACCO NON-USER: CPT | Performed by: PHYSICIAN ASSISTANT

## 2019-03-08 PROCEDURE — 99214 OFFICE O/P EST MOD 30 MIN: CPT | Performed by: PHYSICIAN ASSISTANT

## 2019-03-08 PROCEDURE — 87591 N.GONORRHOEAE DNA AMP PROB: CPT

## 2019-03-08 PROCEDURE — 87491 CHLMYD TRACH DNA AMP PROBE: CPT

## 2019-03-08 PROCEDURE — G8427 DOCREV CUR MEDS BY ELIG CLIN: HCPCS | Performed by: PHYSICIAN ASSISTANT

## 2019-03-08 PROCEDURE — G8420 CALC BMI NORM PARAMETERS: HCPCS | Performed by: PHYSICIAN ASSISTANT

## 2019-03-08 PROCEDURE — 81001 URINALYSIS AUTO W/SCOPE: CPT

## 2019-03-08 RX ORDER — NORTRIPTYLINE HYDROCHLORIDE 10 MG/1
10 CAPSULE ORAL NIGHTLY
Qty: 30 CAPSULE | Refills: 1 | Status: CANCELLED | OUTPATIENT
Start: 2019-03-08

## 2019-03-08 RX ORDER — DOXYCYCLINE HYCLATE 50 MG/1
CAPSULE, GELATIN COATED ORAL
Qty: 30 TABLET | Refills: 1 | Status: CANCELLED | OUTPATIENT
Start: 2019-03-08

## 2019-03-08 RX ORDER — OSELTAMIVIR PHOSPHATE 75 MG/1
75 CAPSULE ORAL DAILY
Qty: 10 CAPSULE | Refills: 0 | Status: SHIPPED | OUTPATIENT
Start: 2019-03-08 | End: 2019-03-18

## 2019-03-08 RX ORDER — FOLIC ACID 1 MG/1
TABLET ORAL
Qty: 30 TABLET | Refills: 1 | Status: CANCELLED | OUTPATIENT
Start: 2019-03-08

## 2019-03-08 SDOH — HEALTH STABILITY: MENTAL HEALTH: HOW MANY STANDARD DRINKS CONTAINING ALCOHOL DO YOU HAVE ON A TYPICAL DAY?: 5 OR 6

## 2019-03-08 SDOH — HEALTH STABILITY: MENTAL HEALTH: HOW OFTEN DO YOU HAVE A DRINK CONTAINING ALCOHOL?: MONTHLY OR LESS

## 2019-03-08 ASSESSMENT — PATIENT HEALTH QUESTIONNAIRE - PHQ9
SUM OF ALL RESPONSES TO PHQ9 QUESTIONS 1 & 2: 2
SUM OF ALL RESPONSES TO PHQ QUESTIONS 1-9: 2
2. FEELING DOWN, DEPRESSED OR HOPELESS: 1
1. LITTLE INTEREST OR PLEASURE IN DOING THINGS: 1
SUM OF ALL RESPONSES TO PHQ QUESTIONS 1-9: 2

## 2019-03-11 LAB
C. TRACHOMATIS DNA ,URINE: NEGATIVE
N. GONORRHOEAE DNA, URINE: NEGATIVE
SPECIMEN DESCRIPTION: NORMAL

## 2019-03-12 DIAGNOSIS — R11.0 NAUSEA: ICD-10-CM

## 2019-03-12 DIAGNOSIS — F32.A DEPRESSION, UNSPECIFIED DEPRESSION TYPE: ICD-10-CM

## 2019-03-12 DIAGNOSIS — Z95.811 LVAD (LEFT VENTRICULAR ASSIST DEVICE) PRESENT (HCC): ICD-10-CM

## 2019-03-12 RX ORDER — FOLIC ACID 1 MG/1
TABLET ORAL
Qty: 30 TABLET | Refills: 5 | Status: SHIPPED | OUTPATIENT
Start: 2019-03-12 | End: 2019-08-20 | Stop reason: SDUPTHER

## 2019-03-12 RX ORDER — NORTRIPTYLINE HYDROCHLORIDE 10 MG/1
10 CAPSULE ORAL NIGHTLY
Qty: 30 CAPSULE | Refills: 5 | OUTPATIENT
Start: 2019-03-12

## 2019-03-12 RX ORDER — PANTOPRAZOLE SODIUM 40 MG/1
TABLET, DELAYED RELEASE ORAL
Qty: 30 TABLET | Refills: 5 | Status: SHIPPED | OUTPATIENT
Start: 2019-03-12 | End: 2019-08-20 | Stop reason: SDUPTHER

## 2019-03-18 DIAGNOSIS — Q21.3 TOF (TETRALOGY OF FALLOT): Chronic | ICD-10-CM

## 2019-03-19 RX ORDER — DOXYCYCLINE HYCLATE 50 MG/1
CAPSULE, GELATIN COATED ORAL
Qty: 30 TABLET | Refills: 11 | Status: SHIPPED | OUTPATIENT
Start: 2019-03-19 | End: 2021-03-26 | Stop reason: SDUPTHER

## 2019-03-22 ENCOUNTER — TELEPHONE (OUTPATIENT)
Dept: PHARMACY | Age: 29
End: 2019-03-22

## 2019-03-23 DIAGNOSIS — I42.0 CARDIOMYOPATHY, DILATED, NONISCHEMIC (HCC): ICD-10-CM

## 2019-03-25 RX ORDER — ASPIRIN 325 MG
325 TABLET ORAL DAILY
Qty: 30 TABLET | Refills: 11 | Status: SHIPPED | OUTPATIENT
Start: 2019-03-25 | End: 2020-03-19

## 2019-04-01 ENCOUNTER — HOSPITAL ENCOUNTER (OUTPATIENT)
Dept: PHARMACY | Age: 29
Setting detail: THERAPIES SERIES
Discharge: HOME | End: 2019-04-01
Payer: MEDICARE

## 2019-04-01 DIAGNOSIS — Z95.811 LVAD (LEFT VENTRICULAR ASSIST DEVICE) PRESENT (HCC): ICD-10-CM

## 2019-04-01 LAB
INR BLD: 4
PROTIME: 48 SECONDS

## 2019-04-01 PROCEDURE — 99211 OFF/OP EST MAY X REQ PHY/QHP: CPT

## 2019-04-01 PROCEDURE — 36416 COLLJ CAPILLARY BLOOD SPEC: CPT

## 2019-04-01 PROCEDURE — 85610 PROTHROMBIN TIME: CPT

## 2019-04-01 NOTE — PROGRESS NOTES
ANTICOAGULATION SERVICE    Date of Clinic Visit:  4/1/2019    Blanca Price is a 29 y.o. female who presents to clinic today for anticoagulation monitoring and adjustment. Recent INR Results:  Internal QC passed  Lab Results   Component Value Date    INR 4 04/01/2019    INR 2.4 11/16/2018       Current Warfarin Dosage:  Dosing Plan  As of 4/1/2019    TTR:   36.9 % (2.9 y)   Full warfarin instructions:   4/1: Hold; Otherwise 6 mg every Tue, Fri; 4 mg all other days               Assessment/Plan:    Modify warfarin dose as noted above: Patient well above target as she was given Lovenox and higher dose at [facility] visit vs. an inpatient stay. Patient had not been to see us since middle of November 2018. We had tried to contact her and schedule appt multiple times since then with no resolution until today. I held today's dose and returned to previous dose. Patient is to return on Thursday this week. She admitted by the end of the visit that she was probably not taking warfarin the way she should have been as she did not know if her levels were to high or low. We discussed the keys  to staying in range: 1. Take the doses as prescribed and 2. Get levels checked regularly. We will see her back Thursday and told her that 1-2 times per month would be good enough to monitor levels. Next Clinic Appointment:  Return date  As of 4/1/2019    TTR:   36.9 % (2.9 y)   Next INR check:   4/4/2019             Please call [facility] Anticoagulation Clinic at [phone] with any questions. Thanks!   Kyaw [place], [address] [facility]  Anticoagulation Service Pharmacist  4/1/2019 3:43 PM

## 2019-04-04 ENCOUNTER — HOSPITAL ENCOUNTER (OUTPATIENT)
Dept: PHARMACY | Age: 29
Setting detail: THERAPIES SERIES
Discharge: HOME | End: 2019-04-04
Payer: MEDICARE

## 2019-04-04 DIAGNOSIS — Z95.811 LVAD (LEFT VENTRICULAR ASSIST DEVICE) PRESENT (HCC): ICD-10-CM

## 2019-04-04 LAB
INR BLD: 3
PROTIME: 35.4 SECONDS

## 2019-04-04 PROCEDURE — 36416 COLLJ CAPILLARY BLOOD SPEC: CPT

## 2019-04-04 PROCEDURE — 85610 PROTHROMBIN TIME: CPT

## 2019-04-04 PROCEDURE — 99211 OFF/OP EST MAY X REQ PHY/QHP: CPT

## 2019-04-11 ENCOUNTER — TELEPHONE (OUTPATIENT)
Dept: PHARMACY | Age: 29
End: 2019-04-11

## 2019-04-17 ENCOUNTER — HOSPITAL ENCOUNTER (OUTPATIENT)
Dept: PHARMACY | Age: 29
Setting detail: THERAPIES SERIES
Discharge: HOME | End: 2019-04-17
Payer: MEDICARE

## 2019-04-17 DIAGNOSIS — Z95.811 LVAD (LEFT VENTRICULAR ASSIST DEVICE) PRESENT (HCC): ICD-10-CM

## 2019-04-17 LAB
INR BLD: 3.2
PROTIME: 37.9 SECONDS

## 2019-04-17 PROCEDURE — 99211 OFF/OP EST MAY X REQ PHY/QHP: CPT

## 2019-04-17 PROCEDURE — 85610 PROTHROMBIN TIME: CPT

## 2019-04-17 PROCEDURE — 36416 COLLJ CAPILLARY BLOOD SPEC: CPT

## 2019-04-17 NOTE — PROGRESS NOTES
ANTICOAGULATION SERVICE    Date of Clinic Visit:  4/17/2019    Zahraa Hatfield is a 29 y.o. female who presents to clinic today for anticoagulation monitoring and adjustment. Recent INR Results:  Internal QC passed  Lab Results   Component Value Date    INR 3.2 04/17/2019    INR 3 04/04/2019       Current Warfarin Dosage:  Dosing Plan  As of 4/17/2019    TTR:   36.3 % (2.9 y)   Full warfarin instructions:   6 mg every Tue; 4 mg all other days               Assessment/Plan:    Modify warfarin dose as noted above: INR is slightly high today. I will try decreasing weekly dose 6%. Recheck 9 days. Next Clinic Appointment:  Return date  As of 4/17/2019    TTR:   36.3 % (2.9 y)   Next INR check:   4/26/2019             Please call [email or web address] Anticoagulation Clinic at [phone] with any questions. Thanks!   Umm Kim [facility]  Anticoagulation Service Pharmacist  4/17/2019 3:28 PM

## 2019-04-22 DIAGNOSIS — Q21.3 TOF (TETRALOGY OF FALLOT): Chronic | ICD-10-CM

## 2019-04-22 RX ORDER — DIPHENOXYLATE HYDROCHLORIDE AND ATROPINE SULFATE 2.5; .025 MG/1; MG/1
TABLET ORAL
Qty: 30 TABLET | Refills: 10 | Status: SHIPPED | OUTPATIENT
Start: 2019-04-22 | End: 2020-03-19

## 2019-04-22 NOTE — TELEPHONE ENCOUNTER
Last Appt:  3/8/2019  Next Appt:   6/7/2019  Med verified in [facility] [address] 4/22/19

## 2019-05-29 ENCOUNTER — TELEPHONE (OUTPATIENT)
Dept: PHARMACY | Age: 29
End: 2019-05-29

## 2019-06-05 ENCOUNTER — TELEPHONE (OUTPATIENT)
Dept: PHARMACY | Age: 29
End: 2019-06-05

## 2019-06-28 ENCOUNTER — TELEPHONE (OUTPATIENT)
Dept: PHARMACY | Age: 29
End: 2019-06-28

## 2019-07-10 ENCOUNTER — TELEPHONE (OUTPATIENT)
Dept: PHARMACY | Age: 29
End: 2019-07-10

## 2019-07-10 NOTE — TELEPHONE ENCOUNTER
Trying to get intouch with patient to reschedule April appt.  could not leave message as mailbox is full

## 2019-07-16 ENCOUNTER — TELEPHONE (OUTPATIENT)
Dept: PHARMACY | Age: 29
End: 2019-07-16

## 2019-08-20 DIAGNOSIS — R11.0 NAUSEA: ICD-10-CM

## 2019-08-20 DIAGNOSIS — Z95.811 LVAD (LEFT VENTRICULAR ASSIST DEVICE) PRESENT (HCC): ICD-10-CM

## 2019-08-20 RX ORDER — PANTOPRAZOLE SODIUM 40 MG/1
TABLET, DELAYED RELEASE ORAL
Qty: 30 TABLET | Refills: 1 | Status: SHIPPED | OUTPATIENT
Start: 2019-08-20 | End: 2019-10-16 | Stop reason: SDUPTHER

## 2019-08-20 RX ORDER — FOLIC ACID 1 MG/1
TABLET ORAL
Qty: 30 TABLET | Refills: 1 | Status: SHIPPED | OUTPATIENT
Start: 2019-08-20 | End: 2019-10-16 | Stop reason: SDUPTHER

## 2019-08-26 ENCOUNTER — OFFICE VISIT (OUTPATIENT)
Dept: PRIMARY CARE CLINIC | Age: 29
End: 2019-08-26
Payer: MEDICARE

## 2019-08-26 VITALS
DIASTOLIC BLOOD PRESSURE: 66 MMHG | TEMPERATURE: 99.2 F | HEIGHT: 62 IN | OXYGEN SATURATION: 99 % | RESPIRATION RATE: 16 BRPM | BODY MASS INDEX: 19.54 KG/M2 | SYSTOLIC BLOOD PRESSURE: 110 MMHG | HEART RATE: 99 BPM | WEIGHT: 106.2 LBS
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
## 2019-08-26 PROCEDURE — G8598 ASA/ANTIPLAT THER USED: HCPCS | Performed by: NURSE PRACTITIONER

## 2019-08-26 PROCEDURE — G8420 CALC BMI NORM PARAMETERS: HCPCS | Performed by: NURSE PRACTITIONER

## 2019-08-26 PROCEDURE — 1036F TOBACCO NON-USER: CPT | Performed by: NURSE PRACTITIONER

## 2019-08-26 PROCEDURE — G8427 DOCREV CUR MEDS BY ELIG CLIN: HCPCS | Performed by: NURSE PRACTITIONER

## 2019-08-26 PROCEDURE — 99213 OFFICE O/P EST LOW 20 MIN: CPT | Performed by: NURSE PRACTITIONER

## 2019-08-26 RX ORDER — IBUPROFEN 600 MG/1
600 TABLET ORAL EVERY 6 HOURS PRN
COMMUNITY
Start: 2014-08-01

## 2019-08-26 RX ORDER — ACETAMINOPHEN 325 MG/1
650 TABLET ORAL EVERY 6 HOURS PRN
COMMUNITY
Start: 2017-02-13

## 2019-08-26 ASSESSMENT — ENCOUNTER SYMPTOMS
RHINORRHEA: 0
SHORTNESS OF BREATH: 0

## 2019-08-26 ASSESSMENT — PATIENT HEALTH QUESTIONNAIRE - PHQ9
SUM OF ALL RESPONSES TO PHQ9 QUESTIONS 1 & 2: 0
SUM OF ALL RESPONSES TO PHQ QUESTIONS 1-9: 0
2. FEELING DOWN, DEPRESSED OR HOPELESS: 0
1. LITTLE INTEREST OR PLEASURE IN DOING THINGS: 0
SUM OF ALL RESPONSES TO PHQ QUESTIONS 1-9: 0

## 2019-08-26 NOTE — PROGRESS NOTES
Aunt         ovarian cancer    Cancer Other         breast cancer (maternal great aunt)    Heart Defect Daughter     Cancer Other         breast cancer (paternal Nidia Claudio aunt)    Blindness Other         daughter    Seizures Other         daughter       Allergies   Allergen Reactions    Latex Rash and Hives     Patient states her skin turns red and begins to blister    Bee Venom Hives     Hives & welts.  Mosquito (Diagnostic) Rash     Extreme rash    Poison Ivy Extract Hives and Rash       Current Outpatient Medications   Medication Sig Dispense Refill    acetaminophen (TYLENOL) 325 MG tablet Take 650 mg by mouth every 6 hours as needed      ibuprofen (ADVIL;MOTRIN) 600 MG tablet Take 600 mg by mouth every 6 hours as needed      hydrocortisone 2.5 % cream Apply topically 2 times daily to affected area. 60 g 1    pantoprazole (PROTONIX) 40 MG tablet TAKE 1 TABLET BY MOUTH EVERY DAY 30 tablet 1    Multiple Vitamin (MULTI-VITAMINS) TABS TAKE 1 TABLET BY MOUTH ONCE DAILY 30 tablet 10    aspirin 325 MG tablet TAKE 1 TABLET BY MOUTH DAILY 30 tablet 11    ferrous gluconate (FERGON) 324 (38 Fe) MG tablet TAKE 1 TABLET BY MOUTH ONCE DAILY WITH BREAKFAST 30 tablet 11    warfarin (COUMADIN) 4 MG tablet TAKE AS DIRECTED AS INSTRUCTED 60 tablet 10    folic acid (FOLVITE) 1 MG tablet Take 1 tablet by mouth daily 30 tablet 3    fluticasone (FLONASE) 50 MCG/ACT nasal spray 2 sprays by Nasal route daily 1 Bottle 1    ondansetron (ZOFRAN ODT) 4 MG disintegrating tablet Take 1 tablet by mouth every 8 hours as needed for Nausea or Vomiting 10 tablet 0    furosemide (LASIX) 20 MG tablet Take 20 mg by mouth as needed       metoprolol succinate (TOPROL XL) 25 MG extended release tablet Take 12.5 mg by mouth      ALPRAZolam (XANAX) 0.25 MG tablet Take 0.25 mg by mouth daily as needed for Sleep or Anxiety.  Indications: Out of Rx       amLODIPine (NORVASC) 10 MG tablet Take 10 mg by mouth daily      magnesium oxide

## 2019-09-19 ENCOUNTER — HOSPITAL ENCOUNTER (OUTPATIENT)
Dept: PHARMACY | Age: 29
Setting detail: THERAPIES SERIES
Discharge: HOME | End: 2019-09-19
Payer: MEDICARE

## 2019-09-19 DIAGNOSIS — Z95.811 LVAD (LEFT VENTRICULAR ASSIST DEVICE) PRESENT (HCC): ICD-10-CM

## 2019-09-19 LAB
INR BLD: 1.6
PROTIME: 19.8 SECONDS

## 2019-09-19 PROCEDURE — 85610 PROTHROMBIN TIME: CPT

## 2019-09-19 PROCEDURE — 36416 COLLJ CAPILLARY BLOOD SPEC: CPT

## 2019-09-19 PROCEDURE — 99211 OFF/OP EST MAY X REQ PHY/QHP: CPT

## 2019-09-26 ENCOUNTER — HOSPITAL ENCOUNTER (EMERGENCY)
Age: 29
Discharge: ANOTHER HOSPITAL | End: 2019-09-27
Attending: EMERGENCY MEDICINE
Payer: MEDICARE

## 2019-09-26 ENCOUNTER — APPOINTMENT (OUTPATIENT)
Dept: GENERAL RADIOLOGY | Age: 29
End: 2019-09-26
Payer: MEDICARE

## 2019-09-26 DIAGNOSIS — Z45.02 ICD (IMPLANTABLE CARDIOVERTER-DEFIBRILLATOR) DISCHARGE: Primary | ICD-10-CM

## 2019-09-26 LAB
ABSOLUTE EOS #: 0.1 K/UL (ref 0–0.4)
ABSOLUTE IMMATURE GRANULOCYTE: ABNORMAL K/UL (ref 0–0.3)
ABSOLUTE LYMPH #: 1.6 K/UL (ref 1–4.8)
ABSOLUTE MONO #: 0.4 K/UL (ref 0.1–1.2)
BASOPHILS # BLD: 1 % (ref 0–1)
BASOPHILS ABSOLUTE: 0 K/UL (ref 0–0.2)
DIFFERENTIAL TYPE: ABNORMAL
EOSINOPHILS RELATIVE PERCENT: 2 % (ref 1–7)
HCT VFR BLD CALC: 36.8 % (ref 36–46)
HEMOGLOBIN: 12.6 G/DL (ref 12–16)
IMMATURE GRANULOCYTES: ABNORMAL %
INR BLD: 2.4
LYMPHOCYTES # BLD: 30 % (ref 16–46)
MCH RBC QN AUTO: 30.5 PG (ref 26–34)
MCHC RBC AUTO-ENTMCNC: 34.1 G/DL (ref 31–37)
MCV RBC AUTO: 89.2 FL (ref 80–100)
MONOCYTES # BLD: 8 % (ref 4–11)
NRBC AUTOMATED: ABNORMAL PER 100 WBC
PDW BLD-RTO: 13.9 % (ref 11–14.5)
PLATELET # BLD: 130 K/UL (ref 140–450)
PLATELET ESTIMATE: ABNORMAL
PMV BLD AUTO: 8.4 FL (ref 6–12)
PROTHROMBIN TIME: 23.8 SEC (ref 9.4–11.3)
RBC # BLD: 4.12 M/UL (ref 4–5.2)
RBC # BLD: ABNORMAL 10*6/UL
SEG NEUTROPHILS: 59 % (ref 43–77)
SEGMENTED NEUTROPHILS ABSOLUTE COUNT: 3.3 K/UL (ref 1.8–7.7)
WBC # BLD: 5.4 K/UL (ref 3.5–11)
WBC # BLD: ABNORMAL 10*3/UL

## 2019-09-26 PROCEDURE — 99285 EMERGENCY DEPT VISIT HI MDM: CPT

## 2019-09-26 PROCEDURE — 84484 ASSAY OF TROPONIN QUANT: CPT

## 2019-09-26 PROCEDURE — 93005 ELECTROCARDIOGRAM TRACING: CPT | Performed by: EMERGENCY MEDICINE

## 2019-09-26 PROCEDURE — 85610 PROTHROMBIN TIME: CPT

## 2019-09-26 PROCEDURE — 36415 COLL VENOUS BLD VENIPUNCTURE: CPT

## 2019-09-26 PROCEDURE — 80048 BASIC METABOLIC PNL TOTAL CA: CPT

## 2019-09-26 PROCEDURE — 85025 COMPLETE CBC W/AUTO DIFF WBC: CPT

## 2019-09-26 PROCEDURE — 71045 X-RAY EXAM CHEST 1 VIEW: CPT

## 2019-09-26 ASSESSMENT — PAIN DESCRIPTION - LOCATION: LOCATION: CHEST

## 2019-09-26 ASSESSMENT — PAIN DESCRIPTION - ORIENTATION: ORIENTATION: LEFT

## 2019-09-26 ASSESSMENT — PAIN DESCRIPTION - DESCRIPTORS: DESCRIPTORS: ACHING

## 2019-09-26 ASSESSMENT — PAIN DESCRIPTION - FREQUENCY: FREQUENCY: CONTINUOUS

## 2019-09-26 ASSESSMENT — PAIN DESCRIPTION - PAIN TYPE: TYPE: ACUTE PAIN

## 2019-09-26 ASSESSMENT — PAIN SCALES - GENERAL: PAINLEVEL_OUTOF10: 6

## 2019-09-27 VITALS
SYSTOLIC BLOOD PRESSURE: 90 MMHG | TEMPERATURE: 99.3 F | OXYGEN SATURATION: 100 % | BODY MASS INDEX: 19.51 KG/M2 | HEIGHT: 62 IN | WEIGHT: 106 LBS | DIASTOLIC BLOOD PRESSURE: 50 MMHG | RESPIRATION RATE: 16 BRPM | HEART RATE: 100 BPM

## 2019-09-27 LAB
ANION GAP SERPL CALCULATED.3IONS-SCNC: 10 MMOL/L (ref 9–17)
BUN BLDV-MCNC: 14 MG/DL (ref 6–20)
BUN/CREAT BLD: 24 (ref 9–20)
CALCIUM SERPL-MCNC: 8.9 MG/DL (ref 8.6–10.4)
CHLORIDE BLD-SCNC: 104 MMOL/L (ref 98–107)
CO2: 24 MMOL/L (ref 20–31)
CREAT SERPL-MCNC: 0.58 MG/DL (ref 0.5–0.9)
EKG ATRIAL RATE: 100 BPM
EKG P AXIS: 76 DEGREES
EKG P-R INTERVAL: 214 MS
EKG Q-T INTERVAL: 424 MS
EKG QRS DURATION: 188 MS
EKG QTC CALCULATION (BAZETT): 546 MS
EKG R AXIS: 142 DEGREES
EKG T AXIS: 94 DEGREES
EKG VENTRICULAR RATE: 100 BPM
GFR AFRICAN AMERICAN: >60 ML/MIN
GFR NON-AFRICAN AMERICAN: >60 ML/MIN
GFR SERPL CREATININE-BSD FRML MDRD: ABNORMAL ML/MIN/{1.73_M2}
GFR SERPL CREATININE-BSD FRML MDRD: ABNORMAL ML/MIN/{1.73_M2}
GLUCOSE BLD-MCNC: 126 MG/DL (ref 70–99)
POTASSIUM SERPL-SCNC: 3.9 MMOL/L (ref 3.7–5.3)
SODIUM BLD-SCNC: 138 MMOL/L (ref 135–144)
TROPONIN INTERP: NORMAL
TROPONIN T: NORMAL NG/ML
TROPONIN, HIGH SENSITIVITY: <6 NG/L (ref 0–14)

## 2019-10-02 ENCOUNTER — HOSPITAL ENCOUNTER (EMERGENCY)
Age: 29
Discharge: ANOTHER HOSPITAL | End: 2019-10-02
Attending: EMERGENCY MEDICINE
Payer: MEDICARE

## 2019-10-02 ENCOUNTER — APPOINTMENT (OUTPATIENT)
Dept: GENERAL RADIOLOGY | Age: 29
End: 2019-10-02
Payer: MEDICARE

## 2019-10-02 VITALS
HEART RATE: 100 BPM | DIASTOLIC BLOOD PRESSURE: 55 MMHG | BODY MASS INDEX: 19.32 KG/M2 | OXYGEN SATURATION: 99 % | RESPIRATION RATE: 20 BRPM | TEMPERATURE: 98.9 F | WEIGHT: 105 LBS | SYSTOLIC BLOOD PRESSURE: 75 MMHG | HEIGHT: 62 IN

## 2019-10-02 DIAGNOSIS — R07.9 CHEST PAIN, UNSPECIFIED TYPE: Primary | ICD-10-CM

## 2019-10-02 DIAGNOSIS — Z95.811 LVAD (LEFT VENTRICULAR ASSIST DEVICE) PRESENT (HCC): ICD-10-CM

## 2019-10-02 DIAGNOSIS — R77.8 ELEVATED TROPONIN: ICD-10-CM

## 2019-10-02 LAB
ABSOLUTE EOS #: 0.1 K/UL (ref 0–0.4)
ABSOLUTE IMMATURE GRANULOCYTE: ABNORMAL K/UL (ref 0–0.3)
ABSOLUTE LYMPH #: 1.7 K/UL (ref 1–4.8)
ABSOLUTE MONO #: 0.3 K/UL (ref 0.1–1.2)
ANION GAP SERPL CALCULATED.3IONS-SCNC: 12 MMOL/L (ref 9–17)
BASOPHILS # BLD: 1 % (ref 0–1)
BASOPHILS ABSOLUTE: 0 K/UL (ref 0–0.2)
BUN BLDV-MCNC: 15 MG/DL (ref 6–20)
BUN/CREAT BLD: 28 (ref 9–20)
CALCIUM SERPL-MCNC: 9.4 MG/DL (ref 8.6–10.4)
CHLORIDE BLD-SCNC: 101 MMOL/L (ref 98–107)
CO2: 23 MMOL/L (ref 20–31)
CREAT SERPL-MCNC: 0.53 MG/DL (ref 0.5–0.9)
DIFFERENTIAL TYPE: ABNORMAL
EKG ATRIAL RATE: 100 BPM
EKG ATRIAL RATE: 97 BPM
EKG P AXIS: -2 DEGREES
EKG P AXIS: 74 DEGREES
EKG P-R INTERVAL: 214 MS
EKG P-R INTERVAL: 250 MS
EKG Q-T INTERVAL: 418 MS
EKG Q-T INTERVAL: 432 MS
EKG QRS DURATION: 188 MS
EKG QRS DURATION: 190 MS
EKG QTC CALCULATION (BAZETT): 539 MS
EKG QTC CALCULATION (BAZETT): 557 MS
EKG R AXIS: -162 DEGREES
EKG R AXIS: 180 DEGREES
EKG T AXIS: 56 DEGREES
EKG T AXIS: 70 DEGREES
EKG VENTRICULAR RATE: 100 BPM
EKG VENTRICULAR RATE: 100 BPM
EOSINOPHILS RELATIVE PERCENT: 2 % (ref 1–7)
GFR AFRICAN AMERICAN: >60 ML/MIN
GFR NON-AFRICAN AMERICAN: >60 ML/MIN
GFR SERPL CREATININE-BSD FRML MDRD: ABNORMAL ML/MIN/{1.73_M2}
GFR SERPL CREATININE-BSD FRML MDRD: ABNORMAL ML/MIN/{1.73_M2}
GLUCOSE BLD-MCNC: 189 MG/DL (ref 70–99)
HCT VFR BLD CALC: 37.2 % (ref 36–46)
HEMOGLOBIN: 12.6 G/DL (ref 12–16)
IMMATURE GRANULOCYTES: ABNORMAL %
INR BLD: 3.1
LYMPHOCYTES # BLD: 40 % (ref 16–46)
MAGNESIUM: 1.9 MG/DL (ref 1.6–2.6)
MCH RBC QN AUTO: 30.4 PG (ref 26–34)
MCHC RBC AUTO-ENTMCNC: 33.7 G/DL (ref 31–37)
MCV RBC AUTO: 90 FL (ref 80–100)
MONOCYTES # BLD: 8 % (ref 4–11)
NRBC AUTOMATED: ABNORMAL PER 100 WBC
PDW BLD-RTO: 14.1 % (ref 11–14.5)
PLATELET # BLD: 123 K/UL (ref 140–450)
PLATELET ESTIMATE: ABNORMAL
PMV BLD AUTO: 8.2 FL (ref 6–12)
POTASSIUM SERPL-SCNC: 3.9 MMOL/L (ref 3.7–5.3)
PROTHROMBIN TIME: 30.8 SEC (ref 9.4–11.3)
RBC # BLD: 4.14 M/UL (ref 4–5.2)
RBC # BLD: ABNORMAL 10*6/UL
SEG NEUTROPHILS: 49 % (ref 43–77)
SEGMENTED NEUTROPHILS ABSOLUTE COUNT: 2.1 K/UL (ref 1.8–7.7)
SODIUM BLD-SCNC: 136 MMOL/L (ref 135–144)
TROPONIN INTERP: ABNORMAL
TROPONIN INTERP: ABNORMAL
TROPONIN T: ABNORMAL NG/ML
TROPONIN T: ABNORMAL NG/ML
TROPONIN, HIGH SENSITIVITY: 111 NG/L (ref 0–14)
TROPONIN, HIGH SENSITIVITY: 114 NG/L (ref 0–14)
WBC # BLD: 4.3 K/UL (ref 3.5–11)
WBC # BLD: ABNORMAL 10*3/UL

## 2019-10-02 PROCEDURE — 93005 ELECTROCARDIOGRAM TRACING: CPT | Performed by: EMERGENCY MEDICINE

## 2019-10-02 PROCEDURE — 6370000000 HC RX 637 (ALT 250 FOR IP): Performed by: EMERGENCY MEDICINE

## 2019-10-02 PROCEDURE — 80048 BASIC METABOLIC PNL TOTAL CA: CPT

## 2019-10-02 PROCEDURE — 83735 ASSAY OF MAGNESIUM: CPT

## 2019-10-02 PROCEDURE — 85610 PROTHROMBIN TIME: CPT

## 2019-10-02 PROCEDURE — 84484 ASSAY OF TROPONIN QUANT: CPT

## 2019-10-02 PROCEDURE — 99285 EMERGENCY DEPT VISIT HI MDM: CPT

## 2019-10-02 PROCEDURE — 85025 COMPLETE CBC W/AUTO DIFF WBC: CPT

## 2019-10-02 PROCEDURE — 71045 X-RAY EXAM CHEST 1 VIEW: CPT

## 2019-10-02 PROCEDURE — 36415 COLL VENOUS BLD VENIPUNCTURE: CPT

## 2019-10-02 RX ORDER — ACETAMINOPHEN 500 MG
1000 TABLET ORAL ONCE
Status: COMPLETED | OUTPATIENT
Start: 2019-10-02 | End: 2019-10-02

## 2019-10-02 RX ADMIN — ACETAMINOPHEN 1000 MG: 500 TABLET, FILM COATED ORAL at 17:00

## 2019-10-02 RX ADMIN — METOPROLOL TARTRATE 12.5 MG: 25 TABLET ORAL at 17:05

## 2019-10-02 ASSESSMENT — ENCOUNTER SYMPTOMS
NAUSEA: 0
ABDOMINAL PAIN: 0
CONSTIPATION: 0
BLOOD IN STOOL: 0
SHORTNESS OF BREATH: 0
DIARRHEA: 0
BACK PAIN: 0
EYE PAIN: 0
VOMITING: 0
COUGH: 0

## 2019-10-02 ASSESSMENT — PAIN SCALES - GENERAL: PAINLEVEL_OUTOF10: 4

## 2019-10-16 DIAGNOSIS — R11.0 NAUSEA: ICD-10-CM

## 2019-10-16 DIAGNOSIS — Z95.811 LVAD (LEFT VENTRICULAR ASSIST DEVICE) PRESENT (HCC): ICD-10-CM

## 2019-10-16 RX ORDER — FOLIC ACID 1 MG/1
TABLET ORAL
Qty: 30 TABLET | Refills: 1 | Status: SHIPPED | OUTPATIENT
Start: 2019-10-16 | End: 2021-03-26 | Stop reason: SDUPTHER

## 2019-10-16 RX ORDER — PANTOPRAZOLE SODIUM 40 MG/1
TABLET, DELAYED RELEASE ORAL
Qty: 30 TABLET | Refills: 1 | Status: SHIPPED | OUTPATIENT
Start: 2019-10-16

## 2019-12-13 DIAGNOSIS — Z95.811 LVAD (LEFT VENTRICULAR ASSIST DEVICE) PRESENT (HCC): ICD-10-CM

## 2019-12-13 DIAGNOSIS — R11.0 NAUSEA: ICD-10-CM

## 2019-12-20 RX ORDER — PANTOPRAZOLE SODIUM 40 MG/1
TABLET, DELAYED RELEASE ORAL
Qty: 30 TABLET | Refills: 10 | OUTPATIENT
Start: 2019-12-20

## 2019-12-20 RX ORDER — FOLIC ACID 1 MG/1
TABLET ORAL
Qty: 30 TABLET | Refills: 10 | OUTPATIENT
Start: 2019-12-20

## 2020-01-06 RX ORDER — FOLIC ACID 1 MG/1
TABLET ORAL
Qty: 30 TABLET | Refills: 10 | OUTPATIENT
Start: 2020-01-06

## 2020-01-06 RX ORDER — PANTOPRAZOLE SODIUM 40 MG/1
TABLET, DELAYED RELEASE ORAL
Qty: 30 TABLET | Refills: 10 | OUTPATIENT
Start: 2020-01-06

## 2020-02-18 RX ORDER — DOXYCYCLINE HYCLATE 50 MG/1
CAPSULE, GELATIN COATED ORAL
Qty: 30 TABLET | Refills: 10 | OUTPATIENT
Start: 2020-02-18

## 2020-03-19 RX ORDER — ASPIRIN 325 MG
TABLET ORAL
Qty: 30 TABLET | Refills: 10 | Status: SHIPPED | OUTPATIENT
Start: 2020-03-19 | End: 2021-03-10

## 2020-03-19 RX ORDER — DIPHENOXYLATE HYDROCHLORIDE AND ATROPINE SULFATE 2.5; .025 MG/1; MG/1
TABLET ORAL
Qty: 30 TABLET | Refills: 10 | Status: SHIPPED | OUTPATIENT
Start: 2020-03-19 | End: 2021-03-10

## 2020-04-09 ENCOUNTER — TELEPHONE (OUTPATIENT)
Dept: FAMILY MEDICINE CLINIC | Age: 30
End: 2020-04-09

## 2020-04-10 NOTE — TELEPHONE ENCOUNTER
negative for Covid 19. Temp 98.7 bringing up green. [place] ON Monday AND WILL CALL Monday WITH UPDATE

## 2020-04-23 ENCOUNTER — TELEPHONE (OUTPATIENT)
Dept: FAMILY MEDICINE CLINIC | Age: 30
End: 2020-04-23

## 2020-04-23 ENCOUNTER — HOSPITAL ENCOUNTER (OUTPATIENT)
Age: 30
Setting detail: SPECIMEN
Discharge: HOME | End: 2020-04-23
Payer: MEDICARE

## 2020-04-23 ENCOUNTER — VIRTUAL VISIT (OUTPATIENT)
Dept: FAMILY MEDICINE CLINIC | Age: 30
End: 2020-04-23
Payer: MEDICARE

## 2020-04-23 VITALS — HEIGHT: 62 IN | TEMPERATURE: 98 F | BODY MASS INDEX: 18.4 KG/M2 | WEIGHT: 100 LBS

## 2020-04-23 LAB
BUN BLDV-MCNC: 11 MG/DL (ref 6–20)
CHLORIDE BLD-SCNC: 105 MMOL/L (ref 98–107)
CO2: 25 MMOL/L (ref 20–31)
CREAT SERPL-MCNC: 0.47 MG/DL (ref 0.5–0.9)
GFR AFRICAN AMERICAN: >60 ML/MIN
GFR NON-AFRICAN AMERICAN: >60 ML/MIN
GFR SERPL CREATININE-BSD FRML MDRD: ABNORMAL ML/MIN/{1.73_M2}
GFR SERPL CREATININE-BSD FRML MDRD: ABNORMAL ML/MIN/{1.73_M2}
POTASSIUM SERPL-SCNC: 3.9 MMOL/L (ref 3.7–5.3)
SODIUM BLD-SCNC: 141 MMOL/L (ref 135–144)

## 2020-04-23 PROCEDURE — 99214 OFFICE O/P EST MOD 30 MIN: CPT | Performed by: PHYSICIAN ASSISTANT

## 2020-04-23 PROCEDURE — 82565 ASSAY OF CREATININE: CPT

## 2020-04-23 PROCEDURE — 84520 ASSAY OF UREA NITROGEN: CPT

## 2020-04-23 PROCEDURE — 84132 ASSAY OF SERUM POTASSIUM: CPT

## 2020-04-23 PROCEDURE — G8427 DOCREV CUR MEDS BY ELIG CLIN: HCPCS | Performed by: PHYSICIAN ASSISTANT

## 2020-04-23 PROCEDURE — 84295 ASSAY OF SERUM SODIUM: CPT

## 2020-04-23 PROCEDURE — 82374 ASSAY BLOOD CARBON DIOXIDE: CPT

## 2020-04-23 PROCEDURE — 82435 ASSAY OF BLOOD CHLORIDE: CPT

## 2020-04-23 ASSESSMENT — PATIENT HEALTH QUESTIONNAIRE - PHQ9
SUM OF ALL RESPONSES TO PHQ9 QUESTIONS 1 & 2: 2
2. FEELING DOWN, DEPRESSED OR HOPELESS: 1
1. LITTLE INTEREST OR PLEASURE IN DOING THINGS: 1
SUM OF ALL RESPONSES TO PHQ QUESTIONS 1-9: 2
SUM OF ALL RESPONSES TO PHQ QUESTIONS 1-9: 2

## 2020-04-24 ASSESSMENT — ENCOUNTER SYMPTOMS
COUGH: 1
SHORTNESS OF BREATH: 1
GASTROINTESTINAL NEGATIVE: 1

## 2020-04-24 NOTE — PROGRESS NOTES
Terra Aguilar is a 34 y.o. female that presents for Other (face to face home health ); Congestive Heart Failure (retaing fluids); and Pharyngitis      This visit was performed via a video visit in patient home. Provider performing visit from office with assistance of nursing personnel, Sherlyn Gutierrez LPN.    HPI:  Patient is evaluated for follow-up of chronic medical conditions. Patient has not been seen over the past few years. Patient admits to transportation issues for office visits. She does have home health nursing currently as arranged by [facility]. Patient was admitted to [facility] a few weeks ago. She had cough for a few weeks and one day of fever. She had negative COVID-19 testing. Patient says that her Lasix dosage was adjusted. She is due for a repeat INR and home health was unable to obtain a specimen today. Patient states that she does not know when her home health nurse will return for labs. She does admit to some pain in her right ear. Her nurse has not evaluated the ear. She admits to an increase in depression recently. Patient says she went through a break-up a few months ago and is now in a new relationship. She does have a counselor that she talks with on the telephone. She has not seen psychiatry but is interested in arranging. I have reviewed the patient's past medical history, past surgical history, allergies,medications, social and family history and I have made updates where appropriate.     Past Medical History:   Diagnosis Date    ACC/AHA stage D heart failure (HCC)     Anxiety     Atrial fibrillation (HCC)     CAD (coronary artery disease)     Cardiomyopathy ([facility] [address] 10/19/2014    Coagulopathy (HCC)     secondary to hepatopathy    Dilated cardiomyopathy ([facility] [address] 3/11/16    Elevated liver enzymes     Hepatomegaly     congestive hepatopathy    History of chickenpox     Hyperthyroidism     resolved    Hypothyroidism     resolved    LVAD (left ventricular assist device) breast cancer (paternal Nidia Claudio aunt)    Blindness Other         daughter    Seizures Other         daughter         Review of Systems   Constitutional: Positive for fatigue. Respiratory: Positive for cough and shortness of breath. Cardiovascular: Negative for leg swelling. Gastrointestinal: Negative. Psychiatric/Behavioral: Positive for dysphoric mood. PHYSICAL EXAM:    Physical Exam  HENT:      Head: Normocephalic. Pulmonary:      Effort: No respiratory distress. Comments: No conversational dyspnea  Neurological:      General: No focal deficit present. Mental Status: She is alert and oriented to person, place, and time. Psychiatric:         Mood and Affect: Mood is depressed. ASSESSMENT & PLAN  Yessy White was seen today for other, congestive heart failure and pharyngitis. Diagnoses and all orders for this visit:    LVAD (left ventricular assist device) present ([facility] [address]    Acute on chronic systolic heart failure (HCC)    Dilated cardiomyopathy ([facility] [address]    Depression, unspecified depression type  -     External Referral To Psychiatry        Return in about 4 weeks (around 5/21/2020). Yessy White received counseling on the following healthy behaviors: medication adherence  Reviewed prior labs and health maintenance. Continue current medications, diet and exercise. Discussed use, benefit, and side effects of prescribed medications. Barriers to medication compliance addressed. Patient given educational materials - see patient instructions. All patient questions answered. Patient voiced understanding. Sheri Fallon is a 34 y.o. female being evaluated by a Virtual Visit (video visit) encounter to address concerns as mentioned above. A caregiver was present when appropriate.  Due to this being a TeleHealth encounter (During ZBYNG-24 public health emergency), evaluation of the following organ systems was limited: Vitals/Constitutional/EENT/Resp/CV/GI//MS/Neuro/Skin/Heme-Lymph-Imm. Pursuant to the emergency declaration under the [address] [place] [address], [address] [place] [address] and the [employer] Act, this Virtual Visit was conducted with patient's (and/or legal guardian's) consent, to reduce the patient's risk of exposure to COVID-19 and provide necessary medical care. The patient (and/or legal guardian) has also been advised to contact this office for worsening conditions or problems, and seek emergency medical treatment and/or call 911 if deemed necessary. Services were provided through a video synchronous discussion virtually to substitute for in-person clinic visit. Patient was located in home and provider located in office. --KJ Rollins on 4/24/2020 at 1:28 PM    An electronic signature was used to authenticate this note.

## 2020-04-28 ENCOUNTER — HOSPITAL ENCOUNTER (OUTPATIENT)
Age: 30
Setting detail: SPECIMEN
Discharge: HOME | End: 2020-04-28
Payer: MEDICARE

## 2020-04-28 ENCOUNTER — TELEPHONE (OUTPATIENT)
Dept: FAMILY MEDICINE CLINIC | Age: 30
End: 2020-04-28

## 2020-04-28 LAB
ABSOLUTE EOS #: 0.04 K/UL (ref 0–0.44)
ABSOLUTE IMMATURE GRANULOCYTE: <0.03 K/UL (ref 0–0.3)
ABSOLUTE LYMPH #: 1.02 K/UL (ref 1.1–3.7)
ABSOLUTE MONO #: 0.27 K/UL (ref 0.1–1.2)
ALP BLD-CCNC: 54 U/L (ref 35–104)
BASOPHILS # BLD: 1 % (ref 0–2)
BASOPHILS ABSOLUTE: <0.03 K/UL (ref 0–0.2)
CREAT SERPL-MCNC: 0.51 MG/DL (ref 0.5–0.9)
DIFFERENTIAL TYPE: ABNORMAL
EOSINOPHILS RELATIVE PERCENT: 1 % (ref 1–4)
GFR AFRICAN AMERICAN: >60 ML/MIN
GFR NON-AFRICAN AMERICAN: >60 ML/MIN
GFR SERPL CREATININE-BSD FRML MDRD: NORMAL ML/MIN/{1.73_M2}
GFR SERPL CREATININE-BSD FRML MDRD: NORMAL ML/MIN/{1.73_M2}
HCT VFR BLD CALC: 40.6 % (ref 36.3–47.1)
HEMOGLOBIN: 13 G/DL (ref 11.9–15.1)
IMMATURE GRANULOCYTES: 0 %
LIPASE: 35 U/L (ref 13–60)
LYMPHOCYTES # BLD: 28 % (ref 24–43)
MCH RBC QN AUTO: 29 PG (ref 25.2–33.5)
MCHC RBC AUTO-ENTMCNC: 32 G/DL (ref 25.2–33.5)
MCV RBC AUTO: 90.6 FL (ref 82.6–102.9)
MONOCYTES # BLD: 8 % (ref 3–12)
NRBC AUTOMATED: ABNORMAL PER 100 WBC
PDW BLD-RTO: 13.1 % (ref 11.8–14.4)
PLATELET # BLD: 113 K/UL (ref 138–453)
PLATELET ESTIMATE: ABNORMAL
PMV BLD AUTO: 10.6 FL (ref 8.1–13.5)
RBC # BLD: 4.48 M/UL (ref 3.95–5.11)
RBC # BLD: ABNORMAL 10*6/UL
SEG NEUTROPHILS: 62 % (ref 36–65)
SEGMENTED NEUTROPHILS ABSOLUTE COUNT: 2.24 K/UL (ref 1.5–8.1)
WBC # BLD: 3.6 K/UL (ref 3.5–11.3)
WBC # BLD: ABNORMAL 10*3/UL

## 2020-04-28 PROCEDURE — 82565 ASSAY OF CREATININE: CPT

## 2020-04-28 PROCEDURE — 85025 COMPLETE CBC W/AUTO DIFF WBC: CPT

## 2020-04-28 PROCEDURE — 83690 ASSAY OF LIPASE: CPT

## 2020-04-28 PROCEDURE — 84075 ASSAY ALKALINE PHOSPHATASE: CPT

## 2020-04-29 ENCOUNTER — TELEPHONE (OUTPATIENT)
Dept: FAMILY MEDICINE CLINIC | Age: 30
End: 2020-04-29

## 2020-05-22 ENCOUNTER — VIRTUAL VISIT (OUTPATIENT)
Dept: FAMILY MEDICINE CLINIC | Age: 30
End: 2020-05-22
Payer: MEDICARE

## 2020-05-22 ENCOUNTER — TELEPHONE (OUTPATIENT)
Dept: FAMILY MEDICINE CLINIC | Age: 30
End: 2020-05-22

## 2020-05-22 VITALS — HEIGHT: 62 IN | BODY MASS INDEX: 18.95 KG/M2 | WEIGHT: 103 LBS

## 2020-05-22 PROCEDURE — G8427 DOCREV CUR MEDS BY ELIG CLIN: HCPCS | Performed by: PHYSICIAN ASSISTANT

## 2020-05-22 PROCEDURE — 99214 OFFICE O/P EST MOD 30 MIN: CPT | Performed by: PHYSICIAN ASSISTANT

## 2020-05-22 RX ORDER — AMOXICILLIN 500 MG/1
500 CAPSULE ORAL 3 TIMES DAILY
Qty: 30 CAPSULE | Refills: 0 | Status: SHIPPED | OUTPATIENT
Start: 2020-05-22 | End: 2020-06-01

## 2020-05-25 ASSESSMENT — ENCOUNTER SYMPTOMS
NAUSEA: 1
RESPIRATORY NEGATIVE: 1

## 2020-06-15 ENCOUNTER — TELEPHONE (OUTPATIENT)
Dept: FAMILY MEDICINE CLINIC | Age: 30
End: 2020-06-15

## 2020-09-29 ENCOUNTER — TELEPHONE (OUTPATIENT)
Dept: FAMILY MEDICINE CLINIC | Age: 30
End: 2020-09-29

## 2020-09-29 NOTE — TELEPHONE ENCOUNTER
Patient states that she is a new patient with [address] and does not have a current obgyn. Patient wants to see Sirisha Armstrong, because she is in a committed relationship and doesn't understand where this came from. She is now worried that she is going to get an infection her her heart because of the cyst and chlamydia, and she will be starting the medications. Informed patient that [facility] did not have an opening at this time and this note is being sent to Sirisha Armstrong to address.

## 2020-09-29 NOTE — TELEPHONE ENCOUNTER
Pt calling stating she was seen at [facility] and just got a call that she tested positive for chlamydia and pt is very upset and has questions, informed the pt that the Health Dept would be contacting her regarding this but pt has a lot of questions. Pt also states she is on a special blood thinner and doesn't see the OBGYN at [facility] until Jan, so pt would really like to be worked in to see [facility] to discuss her current diagnosis and correct meds due to her other medical issues.

## 2020-09-29 NOTE — TELEPHONE ENCOUNTER
Review of Care Everywhere shows positive chlamydia on vaginal swab. She should take prescribed medication. Contact [facility] regarding antibiotic and coumadin. What is patient's question?

## 2020-10-13 ENCOUNTER — TELEPHONE (OUTPATIENT)
Dept: FAMILY MEDICINE CLINIC | Age: 30
End: 2020-10-13

## 2020-11-23 ENCOUNTER — TELEPHONE (OUTPATIENT)
Dept: FAMILY MEDICINE CLINIC | Age: 30
End: 2020-11-23

## 2020-12-28 ENCOUNTER — TELEPHONE (OUTPATIENT)
Dept: FAMILY MEDICINE CLINIC | Age: 30
End: 2020-12-28

## 2020-12-28 NOTE — TELEPHONE ENCOUNTER
Pt called C/O light headed, when she uses the Bowel movement she gets sweaty and feels like she is going to pass out and get very anxious. Her heart rate goes up. She has also been puking after coughing episodes. The Roof of her mouth is raw at this time. Breathing she states she is panting. For the last month she having ear pain. Both ears. She also C/O \"Tingeling\" feeling in her shoulders, arms, and fingers. State she feels full when eating a small amount of food, then she feels bloated and after passing gas she dose feel better. Could she be seen for this? She dose prefer a VV. Patient is also C/O low blood sugar but she has nausea?   Patient asked for Duglas Cedillo to call back at [phone]

## 2021-02-09 DIAGNOSIS — I42.0 CARDIOMYOPATHY, DILATED, NONISCHEMIC (HCC): ICD-10-CM

## 2021-02-09 DIAGNOSIS — Q21.3 TOF (TETRALOGY OF FALLOT): Chronic | ICD-10-CM

## 2021-02-09 RX ORDER — ASPIRIN 325 MG
TABLET ORAL
Qty: 30 TABLET | Refills: 10 | OUTPATIENT
Start: 2021-02-09

## 2021-02-09 RX ORDER — MULTIVITAMIN
TABLET ORAL
Qty: 30 TABLET | Refills: 10 | OUTPATIENT
Start: 2021-02-09

## 2021-02-23 ENCOUNTER — HOSPITAL ENCOUNTER (OUTPATIENT)
Dept: LAB | Age: 31
Discharge: HOME | End: 2021-02-23
Payer: MEDICARE

## 2021-02-23 LAB
ABSOLUTE EOS #: 0.04 K/UL (ref 0–0.44)
ABSOLUTE IMMATURE GRANULOCYTE: <0.03 K/UL (ref 0–0.3)
ABSOLUTE LYMPH #: 0.98 K/UL (ref 1.1–3.7)
ABSOLUTE MONO #: 0.31 K/UL (ref 0.1–1.2)
ALBUMIN SERPL-MCNC: 4.7 G/DL (ref 3.5–5.2)
ALP BLD-CCNC: 50 U/L (ref 35–104)
ALT SERPL-CCNC: 23 U/L (ref 5–33)
AST SERPL-CCNC: 25 U/L
BASOPHILS # BLD: 0 % (ref 0–2)
BASOPHILS ABSOLUTE: <0.03 K/UL (ref 0–0.2)
BILIRUB SERPL-MCNC: 0.72 MG/DL (ref 0.3–1.2)
BILIRUBIN DIRECT: 0.21 MG/DL
BUN BLDV-MCNC: 10 MG/DL (ref 6–20)
CHLORIDE BLD-SCNC: 107 MMOL/L (ref 98–107)
CO2: 27 MMOL/L (ref 20–31)
CREAT SERPL-MCNC: 0.57 MG/DL (ref 0.5–0.9)
DIFFERENTIAL TYPE: ABNORMAL
EOSINOPHILS RELATIVE PERCENT: 1 % (ref 1–4)
GFR AFRICAN AMERICAN: >60 ML/MIN
GFR NON-AFRICAN AMERICAN: >60 ML/MIN
GFR SERPL CREATININE-BSD FRML MDRD: NORMAL ML/MIN/{1.73_M2}
GFR SERPL CREATININE-BSD FRML MDRD: NORMAL ML/MIN/{1.73_M2}
HCT VFR BLD CALC: 38.5 % (ref 36.3–47.1)
HEMOGLOBIN: 12.2 G/DL (ref 11.9–15.1)
IMMATURE GRANULOCYTES: 0 %
INR BLD: 1.8
LACTATE DEHYDROGENASE: 171 U/L (ref 135–214)
LYMPHOCYTES # BLD: 23 % (ref 24–43)
MCH RBC QN AUTO: 29.7 PG (ref 25.2–33.5)
MCHC RBC AUTO-ENTMCNC: 31.7 G/DL (ref 25.2–33.5)
MCV RBC AUTO: 93.7 FL (ref 82.6–102.9)
MONOCYTES # BLD: 7 % (ref 3–12)
NRBC AUTOMATED: 0 PER 100 WBC
PARTIAL THROMBOPLASTIN TIME: 34.8 SEC (ref 23.9–33.8)
PDW BLD-RTO: 13.5 % (ref 11.8–14.4)
PLATELET # BLD: ABNORMAL K/UL (ref 138–453)
PLATELET ESTIMATE: ABNORMAL
PLATELET, FLUORESCENCE: 109 K/UL (ref 138–453)
PLATELET, IMMATURE FRACTION: 3.9 % (ref 1.1–10.3)
PMV BLD AUTO: ABNORMAL FL (ref 8.1–13.5)
POTASSIUM SERPL-SCNC: 3.8 MMOL/L (ref 3.7–5.3)
PROTHROMBIN TIME: 19.9 SEC (ref 11.5–14.2)
RBC # BLD: 4.11 M/UL (ref 3.95–5.11)
RBC # BLD: ABNORMAL 10*6/UL
SEG NEUTROPHILS: 68 % (ref 36–65)
SEGMENTED NEUTROPHILS ABSOLUTE COUNT: 2.86 K/UL (ref 1.5–8.1)
SODIUM BLD-SCNC: 143 MMOL/L (ref 135–144)
WBC # BLD: 4.2 K/UL (ref 3.5–11.3)
WBC # BLD: ABNORMAL 10*3/UL

## 2021-02-23 PROCEDURE — 85055 RETICULATED PLATELET ASSAY: CPT

## 2021-02-23 PROCEDURE — 83615 LACTATE (LD) (LDH) ENZYME: CPT

## 2021-02-23 PROCEDURE — 82374 ASSAY BLOOD CARBON DIOXIDE: CPT

## 2021-02-23 PROCEDURE — 82248 BILIRUBIN DIRECT: CPT

## 2021-02-23 PROCEDURE — 82435 ASSAY OF BLOOD CHLORIDE: CPT

## 2021-02-23 PROCEDURE — 85730 THROMBOPLASTIN TIME PARTIAL: CPT

## 2021-02-23 PROCEDURE — 36415 COLL VENOUS BLD VENIPUNCTURE: CPT

## 2021-02-23 PROCEDURE — 83036 HEMOGLOBIN GLYCOSYLATED A1C: CPT

## 2021-02-23 PROCEDURE — 84450 TRANSFERASE (AST) (SGOT): CPT

## 2021-02-23 PROCEDURE — 82565 ASSAY OF CREATININE: CPT

## 2021-02-23 PROCEDURE — 84479 ASSAY OF THYROID (T3 OR T4): CPT

## 2021-02-23 PROCEDURE — 84460 ALANINE AMINO (ALT) (SGPT): CPT

## 2021-02-23 PROCEDURE — 84520 ASSAY OF UREA NITROGEN: CPT

## 2021-02-23 PROCEDURE — 84134 ASSAY OF PREALBUMIN: CPT

## 2021-02-23 PROCEDURE — 82040 ASSAY OF SERUM ALBUMIN: CPT

## 2021-02-23 PROCEDURE — 84132 ASSAY OF SERUM POTASSIUM: CPT

## 2021-02-23 PROCEDURE — 84295 ASSAY OF SERUM SODIUM: CPT

## 2021-02-23 PROCEDURE — 82977 ASSAY OF GGT: CPT

## 2021-02-23 PROCEDURE — 84075 ASSAY ALKALINE PHOSPHATASE: CPT

## 2021-02-23 PROCEDURE — 85610 PROTHROMBIN TIME: CPT

## 2021-02-23 PROCEDURE — 82247 BILIRUBIN TOTAL: CPT

## 2021-02-23 PROCEDURE — 85025 COMPLETE CBC W/AUTO DIFF WBC: CPT

## 2021-02-23 PROCEDURE — 84439 ASSAY OF FREE THYROXINE: CPT

## 2021-02-24 LAB
ESTIMATED AVERAGE GLUCOSE: 97 MG/DL
GGT: 42 U/L (ref 5–36)
HBA1C MFR BLD: 5 % (ref 4–6)
PREALBUMIN: 20.2 MG/DL (ref 20–40)
THYROXINE UPTAKE: 29.97 % (ref 22.5–37)
THYROXINE, FREE: 1.63 NG/DL (ref 0.93–1.7)

## 2021-02-26 ENCOUNTER — HOSPITAL ENCOUNTER (OUTPATIENT)
Age: 31
Setting detail: SPECIMEN
Discharge: HOME | End: 2021-02-26
Payer: MEDICARE

## 2021-02-26 LAB
ABSOLUTE EOS #: 0.05 K/UL (ref 0–0.44)
ABSOLUTE IMMATURE GRANULOCYTE: <0.03 K/UL (ref 0–0.3)
ABSOLUTE LYMPH #: 1.19 K/UL (ref 1.1–3.7)
ABSOLUTE MONO #: 0.26 K/UL (ref 0.1–1.2)
ALBUMIN SERPL-MCNC: 4.5 G/DL (ref 3.5–5.2)
ALP BLD-CCNC: 45 U/L (ref 35–104)
ALT SERPL-CCNC: 21 U/L (ref 5–33)
AST SERPL-CCNC: 23 U/L
BASOPHILS # BLD: 1 % (ref 0–2)
BASOPHILS ABSOLUTE: 0.03 K/UL (ref 0–0.2)
BILIRUB SERPL-MCNC: 0.58 MG/DL (ref 0.3–1.2)
BILIRUBIN DIRECT: 0.14 MG/DL
BUN BLDV-MCNC: 9 MG/DL (ref 6–20)
CHLORIDE BLD-SCNC: 100 MMOL/L (ref 98–107)
CO2: 26 MMOL/L (ref 20–31)
CREAT SERPL-MCNC: 0.55 MG/DL (ref 0.5–0.9)
DIFFERENTIAL TYPE: NORMAL
EOSINOPHILS RELATIVE PERCENT: 1 % (ref 1–4)
GFR AFRICAN AMERICAN: >60 ML/MIN
GFR NON-AFRICAN AMERICAN: >60 ML/MIN
GFR SERPL CREATININE-BSD FRML MDRD: NORMAL ML/MIN/{1.73_M2}
GFR SERPL CREATININE-BSD FRML MDRD: NORMAL ML/MIN/{1.73_M2}
GGT: 38 U/L (ref 5–36)
HCT VFR BLD CALC: 37.8 % (ref 36.3–47.1)
HEMOGLOBIN: 12 G/DL (ref 11.9–15.1)
IMMATURE GRANULOCYTES: 0 %
INR BLD: 2.4
LACTATE DEHYDROGENASE: 174 U/L (ref 135–214)
LYMPHOCYTES # BLD: 32 % (ref 24–43)
MCH RBC QN AUTO: 29.2 PG (ref 25.2–33.5)
MCHC RBC AUTO-ENTMCNC: 31.7 G/DL (ref 25.2–33.5)
MCV RBC AUTO: 92 FL (ref 82.6–102.9)
MONOCYTES # BLD: 7 % (ref 3–12)
NRBC AUTOMATED: 0 PER 100 WBC
PARTIAL THROMBOPLASTIN TIME: 41.1 SEC (ref 23.9–33.8)
PDW BLD-RTO: 13.2 % (ref 11.8–14.4)
PLATELET # BLD: NORMAL K/UL (ref 138–453)
PLATELET ESTIMATE: NORMAL
PLATELET, FLUORESCENCE: 106 K/UL (ref 138–453)
PLATELET, IMMATURE FRACTION: 4.5 % (ref 1.1–10.3)
PMV BLD AUTO: NORMAL FL (ref 8.1–13.5)
POTASSIUM SERPL-SCNC: 3.7 MMOL/L (ref 3.7–5.3)
PROTHROMBIN TIME: 25.3 SEC (ref 11.5–14.2)
RBC # BLD: 4.11 M/UL (ref 3.95–5.11)
RBC # BLD: NORMAL 10*6/UL
SEG NEUTROPHILS: 59 % (ref 36–65)
SEGMENTED NEUTROPHILS ABSOLUTE COUNT: 2.18 K/UL (ref 1.5–8.1)
SODIUM BLD-SCNC: 135 MMOL/L (ref 135–144)
WBC # BLD: 3.7 K/UL (ref 3.5–11.3)
WBC # BLD: NORMAL 10*3/UL

## 2021-02-26 PROCEDURE — 84132 ASSAY OF SERUM POTASSIUM: CPT

## 2021-02-26 PROCEDURE — 85610 PROTHROMBIN TIME: CPT

## 2021-02-26 PROCEDURE — 85055 RETICULATED PLATELET ASSAY: CPT

## 2021-02-26 PROCEDURE — 85730 THROMBOPLASTIN TIME PARTIAL: CPT

## 2021-02-26 PROCEDURE — 84450 TRANSFERASE (AST) (SGOT): CPT

## 2021-02-26 PROCEDURE — 82248 BILIRUBIN DIRECT: CPT

## 2021-02-26 PROCEDURE — 82977 ASSAY OF GGT: CPT

## 2021-02-26 PROCEDURE — 82247 BILIRUBIN TOTAL: CPT

## 2021-02-26 PROCEDURE — 82565 ASSAY OF CREATININE: CPT

## 2021-02-26 PROCEDURE — 82040 ASSAY OF SERUM ALBUMIN: CPT

## 2021-02-26 PROCEDURE — 85025 COMPLETE CBC W/AUTO DIFF WBC: CPT

## 2021-02-26 PROCEDURE — 84134 ASSAY OF PREALBUMIN: CPT

## 2021-02-26 PROCEDURE — 82435 ASSAY OF BLOOD CHLORIDE: CPT

## 2021-02-26 PROCEDURE — 82374 ASSAY BLOOD CARBON DIOXIDE: CPT

## 2021-02-26 PROCEDURE — 84520 ASSAY OF UREA NITROGEN: CPT

## 2021-02-26 PROCEDURE — 83615 LACTATE (LD) (LDH) ENZYME: CPT

## 2021-02-26 PROCEDURE — 84460 ALANINE AMINO (ALT) (SGPT): CPT

## 2021-02-26 PROCEDURE — 84295 ASSAY OF SERUM SODIUM: CPT

## 2021-02-26 PROCEDURE — 84075 ASSAY ALKALINE PHOSPHATASE: CPT

## 2021-02-27 LAB — PREALBUMIN: 20.1 MG/DL (ref 20–40)

## 2021-03-05 ENCOUNTER — ANTI-COAG VISIT (OUTPATIENT)
Dept: FAMILY MEDICINE CLINIC | Age: 31
End: 2021-03-05

## 2021-03-05 DIAGNOSIS — Z95.811 LVAD (LEFT VENTRICULAR ASSIST DEVICE) PRESENT (HCC): ICD-10-CM

## 2021-03-09 ENCOUNTER — HOSPITAL ENCOUNTER (OUTPATIENT)
Age: 31
Setting detail: SPECIMEN
Discharge: HOME | End: 2021-03-09
Payer: MEDICARE

## 2021-03-09 LAB
INR BLD: 2.2
PROTHROMBIN TIME: 23.8 SEC (ref 11.5–14.2)

## 2021-03-09 PROCEDURE — 85610 PROTHROMBIN TIME: CPT

## 2021-03-10 DIAGNOSIS — Q21.3 TOF (TETRALOGY OF FALLOT): Chronic | ICD-10-CM

## 2021-03-10 DIAGNOSIS — I42.0 CARDIOMYOPATHY, DILATED, NONISCHEMIC (HCC): ICD-10-CM

## 2021-03-10 RX ORDER — ASPIRIN 325 MG
TABLET ORAL
Qty: 30 TABLET | Refills: 0 | Status: SHIPPED | OUTPATIENT
Start: 2021-03-10 | End: 2021-03-26 | Stop reason: SDUPTHER

## 2021-03-10 RX ORDER — MULTIVITAMIN
TABLET ORAL
Qty: 30 TABLET | Refills: 0 | Status: SHIPPED | OUTPATIENT
Start: 2021-03-10 | End: 2021-03-26 | Stop reason: SDUPTHER

## 2021-03-12 ENCOUNTER — HOSPITAL ENCOUNTER (OUTPATIENT)
Age: 31
Setting detail: SPECIMEN
Discharge: HOME | End: 2021-03-12
Payer: MEDICARE

## 2021-03-12 LAB
INR BLD: 1.9
PROTHROMBIN TIME: 21.3 SEC (ref 11.5–14.2)

## 2021-03-12 PROCEDURE — 85610 PROTHROMBIN TIME: CPT

## 2021-03-16 ENCOUNTER — HOSPITAL ENCOUNTER (OUTPATIENT)
Age: 31
Setting detail: SPECIMEN
Discharge: HOME | End: 2021-03-16
Payer: MEDICARE

## 2021-03-16 LAB
INR BLD: 2
PROTHROMBIN TIME: 22 SEC (ref 11.5–14.2)

## 2021-03-16 PROCEDURE — 85610 PROTHROMBIN TIME: CPT

## 2021-03-26 ENCOUNTER — HOSPITAL ENCOUNTER (OUTPATIENT)
Age: 31
Setting detail: SPECIMEN
Discharge: HOME | End: 2021-03-26
Payer: MEDICARE

## 2021-03-26 ENCOUNTER — VIRTUAL VISIT (OUTPATIENT)
Dept: FAMILY MEDICINE CLINIC | Age: 31
End: 2021-03-26
Payer: MEDICARE

## 2021-03-26 DIAGNOSIS — I48.0 PAROXYSMAL ATRIAL FIBRILLATION (HCC): ICD-10-CM

## 2021-03-26 DIAGNOSIS — F32.A DEPRESSION, UNSPECIFIED DEPRESSION TYPE: ICD-10-CM

## 2021-03-26 DIAGNOSIS — I42.0 CARDIOMYOPATHY, DILATED, NONISCHEMIC (HCC): ICD-10-CM

## 2021-03-26 DIAGNOSIS — Z95.811 LVAD (LEFT VENTRICULAR ASSIST DEVICE) PRESENT (HCC): ICD-10-CM

## 2021-03-26 DIAGNOSIS — Q21.3 TOF (TETRALOGY OF FALLOT): Primary | Chronic | ICD-10-CM

## 2021-03-26 LAB
INR BLD: 1.9
PROTHROMBIN TIME: 20.6 SEC (ref 11.5–14.2)

## 2021-03-26 PROCEDURE — G8427 DOCREV CUR MEDS BY ELIG CLIN: HCPCS | Performed by: PHYSICIAN ASSISTANT

## 2021-03-26 PROCEDURE — 99213 OFFICE O/P EST LOW 20 MIN: CPT | Performed by: PHYSICIAN ASSISTANT

## 2021-03-26 PROCEDURE — 99212 OFFICE O/P EST SF 10 MIN: CPT

## 2021-03-26 PROCEDURE — 99212 OFFICE O/P EST SF 10 MIN: CPT | Performed by: PHYSICIAN ASSISTANT

## 2021-03-26 PROCEDURE — 85610 PROTHROMBIN TIME: CPT

## 2021-03-26 RX ORDER — ASPIRIN 325 MG
TABLET ORAL
Qty: 30 TABLET | Refills: 6 | Status: SHIPPED | OUTPATIENT
Start: 2021-03-26 | End: 2021-11-04

## 2021-03-26 RX ORDER — FOLIC ACID 1 MG/1
TABLET ORAL
Qty: 30 TABLET | Refills: 6 | Status: SHIPPED | OUTPATIENT
Start: 2021-03-26 | End: 2021-10-08

## 2021-03-26 RX ORDER — DOXYCYCLINE HYCLATE 50 MG/1
CAPSULE, GELATIN COATED ORAL
Qty: 30 TABLET | Refills: 6 | Status: SHIPPED | OUTPATIENT
Start: 2021-03-26 | End: 2021-10-08

## 2021-03-26 RX ORDER — MULTIVITAMIN
TABLET ORAL
Qty: 30 TABLET | Refills: 6 | Status: SHIPPED | OUTPATIENT
Start: 2021-03-26 | End: 2021-11-10 | Stop reason: SDUPTHER

## 2021-03-26 SDOH — HEALTH STABILITY: MENTAL HEALTH: HOW OFTEN DO YOU HAVE A DRINK CONTAINING ALCOHOL?: NEVER

## 2021-03-26 SDOH — HEALTH STABILITY: MENTAL HEALTH: HOW MANY STANDARD DRINKS CONTAINING ALCOHOL DO YOU HAVE ON A TYPICAL DAY?: NOT ASKED

## 2021-03-26 ASSESSMENT — ENCOUNTER SYMPTOMS
GASTROINTESTINAL NEGATIVE: 1
CHEST TIGHTNESS: 0
SHORTNESS OF BREATH: 0

## 2021-03-26 NOTE — PROGRESS NOTES
Lilliana Noble is a 27 y.o. female that presents for Congestive Heart Failure      This visit was performed via a video visit in patient home. Provider performing visit from office with assistance of nursing personnel, Clement Ferguson LPN.    HPI:  Patient is evaluated for follow-up of chronic medical conditions. Patient says that things seem to be better. She was placed on Lexapro and says this has helped her depression and anxiety. She is following with Willie Serrato at [facility]. She says that she is sleeping better since starting Lexapro. She wakes feeling refreshed. She admits to fighting with a landlord and neighbor recently. She continues to follow very closely with cardiology at [facility]. Patient reports compliance with cardiac medications. She had her pap last fall at [facility] and is scheduled for an UID change. She has been diagnosed with chlamydia, BV, and HPV. She denies any additional concerns or complaints today. I have reviewed the patient's past medical history, past surgical history, allergies,medications, social and family history and I have made updates where appropriate.     Past Medical History:   Diagnosis Date    ACC/AHA stage D heart failure (HCC)     Anxiety     Atrial fibrillation (HCC)     CAD (coronary artery disease)     Cardiomyopathy ([address] 10/19/2014    Coagulopathy (HCC)     secondary to hepatopathy    Dilated cardiomyopathy ([address] 3/11/16    Elevated liver enzymes     Hepatomegaly     congestive hepatopathy    History of chickenpox     Hyperthyroidism     resolved    Hypothyroidism     resolved    LVAD (left ventricular assist device) present ([address] 04/26/16    Migraine     Nausea & vomiting     related to low output heart failure (treated with Haldol in the past)    Neck pain     NICM (nonischemic cardiomyopathy) ([address]     Post partum depression     PTSD (post-traumatic stress disorder)     Pulmonary embolus ([address] 2009    provoked during pregnancy    Right bundle branch block     Sudden cardiac arrest (HCC)     TOF (tetralogy of Fallot)     Ventricular tachycardia ([facility]     s/p BiV ICD placement       Past Surgical History:   Procedure Laterality Date    CARDIAC CATHETERIZATION  16    RA 17, PA 50/25, mean 34, PW 23, PA sat 64%, CO/CI 3.5/2.4    CARDIAC DEFIBRILLATOR PLACEMENT      Multiple subsequent system modifications    CARDIAC DEFIBRILLATOR PLACEMENT  2018    CARDIAC SURGERY  1993    Bilateral branc PA augmentation    CARDIAC SURGERY  2016    LVAD    INTRAUTERINE DEVICE INSERTION      LIVER BIOPSY      elevated live enzymes did not show advanced fibrosis or cirrhosisi    TETRALOGY OF FALLOT REPAIR  92    Yeyo GOLD    THERAPEUTIC       TRANSTHORACIC ECHOCARDIOGRAM  3/2/16    LVEDD 7.21 cm, EF 20-25%, moderately enlarged RV with mod RV dysfunction, biatrial enlargement. Mod MR. Trivial AI, Mod-sev TR, RVSP 42, Mod PI       Social History     Tobacco Use    Smoking status: Never Smoker    Smokeless tobacco: Never Used   Substance Use Topics    Alcohol use: Never     Frequency: Never     Binge frequency: Never    Drug use: No       Family History   Problem Relation Age of Onset    Diabetes Maternal Grandfather     Heart Disease Maternal Grandfather     Heart Disease Paternal Grandfather     Asthma Paternal Grandfather     Heart Disease Mother     Heart Disease Father     Other Paternal Grandmother         dementia    Cancer Maternal Aunt         ovarian cancer    Cancer Other         breast cancer (maternal great aunt)    Heart Defect Daughter     Cancer Other         breast cancer (paternal Nidia Claudio aunt)    Blindness Other         daughter    Seizures Other         daughter         Review of Systems   Constitutional: Negative. Negative for fatigue. HENT: Negative. Respiratory: Negative for chest tightness and shortness of breath. Cardiovascular: Negative. Negative for chest pain. Gastrointestinal: Negative. PHYSICAL EXAM:    Physical Exam  HENT:      Head: Normocephalic. Pulmonary:      Effort: No respiratory distress. Neurological:      General: No focal deficit present. Mental Status: She is alert and oriented to person, place, and time. Psychiatric:         Mood and Affect: Mood normal.          ASSESSMENT & PLAN  Kemal Bran was seen today for congestive heart failure. Diagnoses and all orders for this visit:    TOF (tetralogy of Fallot)  -     Multiple Vitamin (MULTIVITAMIN) TABS; TAKE 1 TABLET BY MOUTH DAILY  -     ferrous gluconate (FERGON) 324 (38 Fe) MG tablet; TAKE 1 TABLET BY MOUTH ONCE DAILY WITH BREAKFAST    Cardiomyopathy, dilated, nonischemic (HCC)  -     aspirin 325 MG tablet; TAKE 1 TABLET BY MOUTH EVERY DAY    LVAD (left ventricular assist device) present (HCC)  -     folic acid (FOLVITE) 1 MG tablet; TAKE 1 TABLET BY MOUTH DAILY    Paroxysmal atrial fibrillation (HCC)    Depression, unspecified depression type    Continue to follow closely with cardiology and psychiatry. Return in about 6 months (around 9/26/2021). All patient questions answered. Patient voiced understanding. Melanie Krishna is a 27 y.o. female being evaluated by a Virtual Visit (video visit) encounter to address concerns as mentioned above. A caregiver was present when appropriate. Due to this being a TeleHealth encounter (During [facility]- public health emergency), evaluation of the following organ systems was limited: Vitals/Constitutional/EENT/Resp/CV/GI//MS/Neuro/Skin/Heme-Lymph-Imm. Pursuant to the emergency declaration under the [address] [place] [address], [address] [place] [address] authority and the [employer] Act, this Virtual Visit was conducted with patient's (and/or legal guardian's) consent, to reduce the patient's risk of exposure to COVID-19 and provide necessary medical care.   The patient (and/or legal guardian) has also been advised to contact this office for worsening conditions or problems, and seek emergency medical treatment and/or call 911 if deemed necessary. Services were provided through a video synchronous discussion virtually to substitute for in-person clinic visit. Patient was located in home and provider located in office. --KJ Daniel on 3/26/2021 at 12:58 PM    An electronic signature was used to authenticate this note.

## 2021-04-01 ENCOUNTER — HOSPITAL ENCOUNTER (OUTPATIENT)
Age: 31
Setting detail: SPECIMEN
Discharge: HOME | End: 2021-04-01
Payer: MEDICARE

## 2021-04-01 LAB
INR BLD: 1.9
PROTHROMBIN TIME: 20.9 SEC (ref 11.5–14.2)

## 2021-04-01 PROCEDURE — 85610 PROTHROMBIN TIME: CPT

## 2021-04-12 ENCOUNTER — HOSPITAL ENCOUNTER (OUTPATIENT)
Age: 31
Setting detail: SPECIMEN
Discharge: HOME | End: 2021-04-12
Payer: MEDICARE

## 2021-04-12 LAB
INR BLD: 2.4
PROTHROMBIN TIME: 25 SEC (ref 11.5–14.2)

## 2021-04-12 PROCEDURE — 85610 PROTHROMBIN TIME: CPT

## 2021-04-15 ENCOUNTER — HOSPITAL ENCOUNTER (OUTPATIENT)
Age: 31
Setting detail: SPECIMEN
Discharge: HOME | End: 2021-04-15
Payer: MEDICARE

## 2021-04-15 LAB
INR BLD: 3
PROTHROMBIN TIME: 29.2 SEC (ref 11.5–14.2)

## 2021-04-15 PROCEDURE — 85610 PROTHROMBIN TIME: CPT

## 2021-04-20 ENCOUNTER — HOSPITAL ENCOUNTER (OUTPATIENT)
Age: 31
Setting detail: SPECIMEN
Discharge: HOME | End: 2021-04-20
Payer: MEDICARE

## 2021-04-20 LAB
INR BLD: 2.4
PROTHROMBIN TIME: 24.8 SEC (ref 11.5–14.2)

## 2021-04-20 PROCEDURE — 85610 PROTHROMBIN TIME: CPT

## 2021-04-22 ENCOUNTER — HOSPITAL ENCOUNTER (OUTPATIENT)
Age: 31
Setting detail: SPECIMEN
Discharge: HOME | End: 2021-04-22
Payer: MEDICARE

## 2021-04-22 LAB
INR BLD: 2.3
PROTHROMBIN TIME: 24.4 SEC (ref 11.5–14.2)

## 2021-04-22 PROCEDURE — 85610 PROTHROMBIN TIME: CPT

## 2021-04-29 ENCOUNTER — HOSPITAL ENCOUNTER (OUTPATIENT)
Age: 31
Setting detail: SPECIMEN
Discharge: HOME | End: 2021-04-29
Payer: MEDICARE

## 2021-04-29 LAB
INR BLD: 1.9
PROTHROMBIN TIME: 20.7 SEC (ref 11.5–14.2)

## 2021-04-29 PROCEDURE — 85610 PROTHROMBIN TIME: CPT

## 2021-05-18 ENCOUNTER — HOSPITAL ENCOUNTER (OUTPATIENT)
Age: 31
Setting detail: SPECIMEN
Discharge: HOME | End: 2021-05-18
Payer: MEDICARE

## 2021-05-18 LAB
INR BLD: 2.3
PROTHROMBIN TIME: 24.5 SEC (ref 11.5–14.2)

## 2021-05-18 PROCEDURE — 85610 PROTHROMBIN TIME: CPT

## 2021-06-10 ENCOUNTER — HOSPITAL ENCOUNTER (OUTPATIENT)
Age: 31
Setting detail: SPECIMEN
Discharge: HOME | End: 2021-06-10
Payer: MEDICARE

## 2021-06-10 LAB
INR BLD: 2
PROTHROMBIN TIME: 21.7 SEC (ref 11.5–14.2)

## 2021-06-10 PROCEDURE — 85610 PROTHROMBIN TIME: CPT

## 2021-06-18 ENCOUNTER — HOSPITAL ENCOUNTER (OUTPATIENT)
Age: 31
Setting detail: SPECIMEN
Discharge: HOME | End: 2021-06-18
Payer: MEDICARE

## 2021-06-18 LAB
ABSOLUTE EOS #: 0.12 K/UL (ref 0–0.44)
ABSOLUTE IMMATURE GRANULOCYTE: <0.03 K/UL (ref 0–0.3)
ABSOLUTE LYMPH #: 1.16 K/UL (ref 1.1–3.7)
ABSOLUTE MONO #: 0.36 K/UL (ref 0.1–1.2)
ALBUMIN SERPL-MCNC: 4.5 G/DL (ref 3.5–5.2)
ALBUMIN/GLOBULIN RATIO: 1.7 (ref 1–2.5)
ALP BLD-CCNC: 61 U/L (ref 35–104)
ALT SERPL-CCNC: 17 U/L (ref 5–33)
ANION GAP SERPL CALCULATED.3IONS-SCNC: 8 MMOL/L (ref 9–17)
AST SERPL-CCNC: 21 U/L
BASOPHILS # BLD: 1 % (ref 0–2)
BASOPHILS ABSOLUTE: <0.03 K/UL (ref 0–0.2)
BILIRUB SERPL-MCNC: 0.76 MG/DL (ref 0.3–1.2)
BILIRUBIN DIRECT: 0.18 MG/DL
BILIRUBIN, INDIRECT: 0.58 MG/DL (ref 0–1)
BUN BLDV-MCNC: 16 MG/DL (ref 6–20)
BUN/CREAT BLD: 31 (ref 9–20)
CALCIUM SERPL-MCNC: 9.2 MG/DL (ref 8.6–10.4)
CHLORIDE BLD-SCNC: 106 MMOL/L (ref 98–107)
CO2: 25 MMOL/L (ref 20–31)
CREAT SERPL-MCNC: 0.52 MG/DL (ref 0.5–0.9)
DIFFERENTIAL TYPE: NORMAL
EOSINOPHILS RELATIVE PERCENT: 3 % (ref 1–4)
GFR AFRICAN AMERICAN: >60 ML/MIN
GFR NON-AFRICAN AMERICAN: >60 ML/MIN
GFR SERPL CREATININE-BSD FRML MDRD: ABNORMAL ML/MIN/{1.73_M2}
GFR SERPL CREATININE-BSD FRML MDRD: ABNORMAL ML/MIN/{1.73_M2}
GLOBULIN: 2.7 G/DL (ref 1.5–3.8)
GLUCOSE BLD-MCNC: 88 MG/DL (ref 70–99)
HCT VFR BLD CALC: 36.9 % (ref 36.3–47.1)
HEMOGLOBIN: 11.9 G/DL (ref 11.9–15.1)
IMMATURE GRANULOCYTES: 0 %
INR BLD: 1.8
LYMPHOCYTES # BLD: 27 % (ref 24–43)
MCH RBC QN AUTO: 29.5 PG (ref 25.2–33.5)
MCHC RBC AUTO-ENTMCNC: 32.2 G/DL (ref 25.2–33.5)
MCV RBC AUTO: 91.3 FL (ref 82.6–102.9)
MONOCYTES # BLD: 8 % (ref 3–12)
NRBC AUTOMATED: 0 PER 100 WBC
PDW BLD-RTO: 13.4 % (ref 11.8–14.4)
PLATELET # BLD: NORMAL K/UL (ref 138–453)
PLATELET ESTIMATE: NORMAL
PLATELET, FLUORESCENCE: 97 K/UL (ref 138–453)
PLATELET, IMMATURE FRACTION: 4.6 % (ref 1.1–10.3)
PMV BLD AUTO: NORMAL FL (ref 8.1–13.5)
POTASSIUM SERPL-SCNC: 3.8 MMOL/L (ref 3.7–5.3)
PROTHROMBIN TIME: 19.9 SEC (ref 11.5–14.2)
RBC # BLD: 4.04 M/UL (ref 3.95–5.11)
RBC # BLD: NORMAL 10*6/UL
SEG NEUTROPHILS: 61 % (ref 36–65)
SEGMENTED NEUTROPHILS ABSOLUTE COUNT: 2.65 K/UL (ref 1.5–8.1)
SODIUM BLD-SCNC: 139 MMOL/L (ref 135–144)
TOTAL PROTEIN: 7.2 G/DL (ref 6.4–8.3)
WBC # BLD: 4.3 K/UL (ref 3.5–11.3)
WBC # BLD: NORMAL 10*3/UL

## 2021-06-18 PROCEDURE — 80048 BASIC METABOLIC PNL TOTAL CA: CPT

## 2021-06-18 PROCEDURE — 85610 PROTHROMBIN TIME: CPT

## 2021-06-18 PROCEDURE — 80076 HEPATIC FUNCTION PANEL: CPT

## 2021-06-18 PROCEDURE — 85055 RETICULATED PLATELET ASSAY: CPT

## 2021-06-18 PROCEDURE — 85025 COMPLETE CBC W/AUTO DIFF WBC: CPT

## 2021-06-22 ENCOUNTER — HOSPITAL ENCOUNTER (OUTPATIENT)
Age: 31
Setting detail: SPECIMEN
Discharge: HOME | End: 2021-06-22
Payer: MEDICARE

## 2021-06-22 LAB
INR BLD: 2
PROTHROMBIN TIME: 21.4 SEC (ref 11.5–14.2)

## 2021-06-22 PROCEDURE — 85610 PROTHROMBIN TIME: CPT

## 2021-09-27 ENCOUNTER — TELEPHONE (OUTPATIENT)
Dept: FAMILY MEDICINE CLINIC | Age: 31
End: 2021-09-27

## 2021-09-27 NOTE — TELEPHONE ENCOUNTER
----- Message from Richa Santamaria sent at 9/27/2021  1:49 PM EDT -----  Subject: Appointment Request    Reason for Call: Urgent (Patient Request) No Script    QUESTIONS  Type of Appointment? Established Patient  Reason for appointment request? No appointments available during search  Additional Information for Provider? patient is requesting a virtual visit   for a 6 month follow up. She is hoping to get in sooner than november. ---------------------------------------------------------------------------  --------------  Teresa YEUNG  What is the best way for the office to contact you? OK to leave message on   voicemail  Preferred Call Back Phone Number? [phone]  ---------------------------------------------------------------------------  --------------  SCRIPT ANSWERS  Relationship to Patient? Self  (Is the patient requesting to see the provider for a procedure?)? No  (Is the patient requesting to see the provider urgently  today or   tomorrow. )? Yes  Have you been diagnosed with, awaiting test results for, or told that you   are suspected of having COVID-19 (Coronavirus)? (If patient has tested   negative or was tested as a requirement for work, school, or travel and   not based on symptoms, answer no)? No  Within the past two weeks have you developed any of the following symptoms   (answer no if symptoms have been present longer than 2 weeks or began   more than 2 weeks ago)? Fever or Chills, Cough, Shortness of breath or   difficulty breathing, Loss of taste or smell, Sore throat, Nasal   congestion, Sneezing or runny nose, Fatigue or generalized body aches   (answer no if pain is specific to a body part e.g. back pain), Diarrhea,   Headache? No  Have you had close contact with someone with COVID-19 in the last 14 days? No  (Service Expert  click yes below to proceed with [facility] As Usual   Scheduling)?  Yes

## 2021-09-27 NOTE — TELEPHONE ENCOUNTER
Spoke with patient regarding missed appt today, 9/27/21. Patient thought appt was virtual, so she was waiting for a phone call. She has already called and spoken to someone about rescheduling and is waiting for a call back with where they can work her in, as there are no open appts available until November.

## 2021-10-01 ENCOUNTER — TELEPHONE (OUTPATIENT)
Dept: FAMILY MEDICINE CLINIC | Age: 31
End: 2021-10-01

## 2021-10-01 ENCOUNTER — HOSPITAL ENCOUNTER (OUTPATIENT)
Dept: LAB | Age: 31
Discharge: HOME | End: 2021-10-01
Payer: MEDICARE

## 2021-10-01 LAB
INR BLD: 2.3
PROTHROMBIN TIME: 24.5 SEC (ref 11.5–14.2)

## 2021-10-01 PROCEDURE — 36415 COLL VENOUS BLD VENIPUNCTURE: CPT

## 2021-10-01 PROCEDURE — 85610 PROTHROMBIN TIME: CPT

## 2021-10-01 NOTE — TELEPHONE ENCOUNTER
Enoch Phalen is calling wanting a PT/INR lab order faxed to the [facility] so she can get it done.  Mom states that she knows that she has been behind on her INR's and the [facility] is open till 8 pm. Please call Vic Harman is there is any issues

## 2021-10-08 DIAGNOSIS — Z95.811 LVAD (LEFT VENTRICULAR ASSIST DEVICE) PRESENT (HCC): ICD-10-CM

## 2021-10-08 DIAGNOSIS — Q21.3 TOF (TETRALOGY OF FALLOT): Chronic | ICD-10-CM

## 2021-10-08 RX ORDER — DOXYCYCLINE HYCLATE 50 MG/1
CAPSULE, GELATIN COATED ORAL
Qty: 30 TABLET | Refills: 0 | Status: SHIPPED | OUTPATIENT
Start: 2021-10-08 | End: 2021-11-08

## 2021-10-08 RX ORDER — FOLIC ACID 1 MG/1
TABLET ORAL
Qty: 30 TABLET | Refills: 0 | Status: SHIPPED | OUTPATIENT
Start: 2021-10-08 | End: 2021-11-08

## 2021-10-08 NOTE — TELEPHONE ENCOUNTER
Last Appt:  3/26/2021  Next Appt:   10/20/2021  Med verified in [facility] [address]

## 2021-10-12 DIAGNOSIS — Z95.811 LVAD (LEFT VENTRICULAR ASSIST DEVICE) PRESENT (HCC): ICD-10-CM

## 2021-10-12 DIAGNOSIS — Q21.3 TOF (TETRALOGY OF FALLOT): Chronic | ICD-10-CM

## 2021-10-12 RX ORDER — DOXYCYCLINE HYCLATE 50 MG/1
CAPSULE, GELATIN COATED ORAL
Qty: 30 TABLET | Refills: 10 | OUTPATIENT
Start: 2021-10-12

## 2021-10-12 RX ORDER — FOLIC ACID 1 MG/1
TABLET ORAL
Qty: 30 TABLET | Refills: 10 | OUTPATIENT
Start: 2021-10-12

## 2021-10-20 ENCOUNTER — TELEPHONE (OUTPATIENT)
Dept: FAMILY MEDICINE CLINIC | Age: 31
End: 2021-10-20

## 2021-10-21 ENCOUNTER — TELEPHONE (OUTPATIENT)
Dept: FAMILY MEDICINE CLINIC | Age: 31
End: 2021-10-21

## 2021-10-21 NOTE — TELEPHONE ENCOUNTER
Spoke with patient regarding missed appt on 10/20/21. Patient states her mother, who drives her to her appts, had to work and was unable to transport. Rescheduled for 12/8/21.

## 2021-11-04 DIAGNOSIS — I42.0 CARDIOMYOPATHY, DILATED, NONISCHEMIC (HCC): ICD-10-CM

## 2021-11-04 RX ORDER — ASPIRIN 325 MG
TABLET ORAL
Qty: 30 TABLET | Refills: 0 | Status: SHIPPED | OUTPATIENT
Start: 2021-11-04 | End: 2021-12-03

## 2021-11-04 NOTE — TELEPHONE ENCOUNTER
Last Appt:  3/26/2021  Next Appt:   12/8/2021  Med verified in [facility] [address]

## 2021-11-05 DIAGNOSIS — Q21.3 TOF (TETRALOGY OF FALLOT): Chronic | ICD-10-CM

## 2021-11-05 DIAGNOSIS — Z95.811 LVAD (LEFT VENTRICULAR ASSIST DEVICE) PRESENT (HCC): ICD-10-CM

## 2021-11-08 RX ORDER — DOXYCYCLINE HYCLATE 50 MG/1
CAPSULE, GELATIN COATED ORAL
Qty: 30 TABLET | Refills: 0 | Status: SHIPPED | OUTPATIENT
Start: 2021-11-08 | End: 2021-12-03

## 2021-11-08 RX ORDER — FOLIC ACID 1 MG/1
TABLET ORAL
Qty: 30 TABLET | Refills: 0 | Status: SHIPPED | OUTPATIENT
Start: 2021-11-08 | End: 2021-12-03

## 2021-11-08 NOTE — TELEPHONE ENCOUNTER
Rodrigue Wilson called requesting a refill of the below medication which has been pended for you:     Requested Prescriptions     Pending Prescriptions Disp Refills    folic acid (FOLVITE) 1 MG tablet [Pharmacy Med Name: FOLIC ACID 1 MG TABS 1 Tablet] 30 tablet 0     Sig: TAKE 1 TABLET BY MOUTH DAILY    ferrous gluconate (FERGON) 324 (38 Fe) MG tablet [Pharmacy Med Name: FERROUS GLUC 324MG  (38 FE) Tablet] 30 tablet 0     Sig: TAKE ONE (1) TABLET BY MOUTH ONCE DAILY WITH BREAKFAST       Last Appointment Date: 3/26/2021  Next Appointment Date: 12/8/2021    Allergies   Allergen Reactions    Latex Rash and Hives     Patient states her skin turns red and begins to blister    Bee Venom Hives     Hives & welts.       Mosquito (Diagnostic) Rash     Extreme rash    Poison Ivy Extract Hives and Rash

## 2021-11-08 NOTE — TELEPHONE ENCOUNTER
Last Appt:  3/26/2021  Next Appt:   12/8/2021  Med verified in [facility] [address]

## 2021-11-10 DIAGNOSIS — Q21.3 TOF (TETRALOGY OF FALLOT): Chronic | ICD-10-CM

## 2021-11-10 DIAGNOSIS — Z95.811 LVAD (LEFT VENTRICULAR ASSIST DEVICE) PRESENT (HCC): ICD-10-CM

## 2021-11-10 RX ORDER — DOXYCYCLINE HYCLATE 50 MG/1
CAPSULE, GELATIN COATED ORAL
Qty: 90 TABLET | Refills: 0 | Status: CANCELLED | OUTPATIENT
Start: 2021-11-10

## 2021-11-10 RX ORDER — FOLIC ACID 1 MG/1
TABLET ORAL
Qty: 90 TABLET | Refills: 0 | Status: CANCELLED | OUTPATIENT
Start: 2021-11-10

## 2021-11-10 NOTE — TELEPHONE ENCOUNTER
Arabella called requesting a refill of the below medication which has been pended for you:     Filled for 30 days by GO 11/8/21- forwarding to [facility] for when she returns    Requested Prescriptions     Pending Prescriptions Disp Refills    folic acid (FOLVITE) 1 MG tablet 30 tablet 0     Sig: TAKE 1 TABLET BY MOUTH DAILY    ferrous gluconate (FERGON) 324 (38 Fe) MG tablet 30 tablet 0     Sig: TAKE ONE (1) TABLET BY MOUTH ONCE DAILY WITH BREAKFAST       Last Appointment Date: 3/26/2021  Next Appointment Date: 12/8/2021    Allergies   Allergen Reactions    Latex Rash and Hives     Patient states her skin turns red and begins to blister    Bee Venom Hives     Hives & welts.       Mosquito (Diagnostic) Rash     Extreme rash    Poison Ivy Extract Hives and Rash

## 2021-11-10 NOTE — TELEPHONE ENCOUNTER
Nilesh Humphrey called requesting a refill of the below medication which has been pended for you:     Requested Prescriptions     Pending Prescriptions Disp Refills    Multiple Vitamin (MULTIVITAMIN) TABS 30 tablet 0     Sig: TAKE 1 TABLET BY MOUTH DAILY       Last Appointment Date: 3/26/2021  Next Appointment Date: 12/8/2021    Allergies   Allergen Reactions    Latex Rash and Hives     Patient states her skin turns red and begins to blister    Bee Venom Hives     Hives & welts.       Mosquito (Diagnostic) Rash     Extreme rash    Poison Ivy Extract Hives and Rash

## 2021-11-11 RX ORDER — MULTIVITAMIN
TABLET ORAL
Qty: 30 TABLET | Refills: 0 | Status: SHIPPED | OUTPATIENT
Start: 2021-11-11 | End: 2021-12-03

## 2021-12-03 DIAGNOSIS — Z95.811 LVAD (LEFT VENTRICULAR ASSIST DEVICE) PRESENT (HCC): ICD-10-CM

## 2021-12-03 DIAGNOSIS — I42.0 CARDIOMYOPATHY, DILATED, NONISCHEMIC (HCC): ICD-10-CM

## 2021-12-03 DIAGNOSIS — Q21.3 TOF (TETRALOGY OF FALLOT): Chronic | ICD-10-CM

## 2021-12-03 RX ORDER — DOXYCYCLINE HYCLATE 50 MG/1
CAPSULE, GELATIN COATED ORAL
Qty: 30 TABLET | Refills: 0 | Status: SHIPPED | OUTPATIENT
Start: 2021-12-03

## 2021-12-03 RX ORDER — ASPIRIN 325 MG
TABLET ORAL
Qty: 30 TABLET | Refills: 0 | Status: SHIPPED | OUTPATIENT
Start: 2021-12-03

## 2021-12-03 RX ORDER — MULTIVITAMIN
TABLET ORAL
Qty: 30 TABLET | Refills: 11 | Status: SHIPPED | OUTPATIENT
Start: 2021-12-03

## 2021-12-03 RX ORDER — FOLIC ACID 1 MG/1
TABLET ORAL
Qty: 30 TABLET | Refills: 0 | Status: SHIPPED | OUTPATIENT
Start: 2021-12-03

## 2021-12-03 NOTE — TELEPHONE ENCOUNTER
Last Appt:  3/26/2021  Next Appt:   12/8/2021  Med verified in [facility] [address]

## 2021-12-03 NOTE — TELEPHONE ENCOUNTER
Last Appt:  3/26/2021  Next Appt:   12/3/2021  Med verified in [facility] [address]

## 2021-12-03 NOTE — TELEPHONE ENCOUNTER
Gabbie Cabral called requesting a refill of the below medication which has been pended for you:     Requested Prescriptions     Pending Prescriptions Disp Refills    Multiple Vitamin (MULTIVITAMIN) TABS [Pharmacy Med Name: MULTIVITAMIN TABS Tablet] 30 tablet 0     Sig: TAKE 1 TABLET BY MOUTH DAILY       Last Appointment Date: 03/26/2021  Next Appointment Date: 12/08/2021    Allergies   Allergen Reactions    Latex Rash and Hives     Patient states her skin turns red and begins to blister    Bee Venom Hives     Hives & welts.       Mosquito (Diagnostic) Rash     Extreme rash    Poison Ivy Extract Hives and Rash

## 2021-12-08 ENCOUNTER — TELEPHONE (OUTPATIENT)
Dept: FAMILY MEDICINE CLINIC | Age: 31
End: 2021-12-08

## 2021-12-08 NOTE — TELEPHONE ENCOUNTER
Attempted to reach patient regarding missed appointment on 12/8/21. Unable to contact at this time. Unable to leave message. Left message to reschedule. This is patient's 3rd no show.

## 2022-01-11 ENCOUNTER — HOSPITAL ENCOUNTER (OUTPATIENT)
Dept: LAB | Age: 32
Discharge: HOME | End: 2022-01-11
Payer: MEDICARE

## 2022-01-11 LAB
BUN BLDV-MCNC: 13 MG/DL (ref 6–20)
CHLORIDE BLD-SCNC: 104 MMOL/L (ref 98–107)
CO2: 25 MMOL/L (ref 20–31)
CREAT SERPL-MCNC: 0.63 MG/DL (ref 0.5–0.9)
GFR AFRICAN AMERICAN: >60 ML/MIN
GFR NON-AFRICAN AMERICAN: >60 ML/MIN
GFR SERPL CREATININE-BSD FRML MDRD: NORMAL ML/MIN/{1.73_M2}
GFR SERPL CREATININE-BSD FRML MDRD: NORMAL ML/MIN/{1.73_M2}
INR BLD: 1.8
POTASSIUM SERPL-SCNC: 3.6 MMOL/L (ref 3.7–5.3)
PROTHROMBIN TIME: 19.9 SEC (ref 11.5–14.2)
SODIUM BLD-SCNC: 140 MMOL/L (ref 135–144)

## 2022-01-11 PROCEDURE — 84520 ASSAY OF UREA NITROGEN: CPT

## 2022-01-11 PROCEDURE — 82435 ASSAY OF BLOOD CHLORIDE: CPT

## 2022-01-11 PROCEDURE — 84295 ASSAY OF SERUM SODIUM: CPT

## 2022-01-11 PROCEDURE — 85610 PROTHROMBIN TIME: CPT

## 2022-01-11 PROCEDURE — 82374 ASSAY BLOOD CARBON DIOXIDE: CPT

## 2022-01-11 PROCEDURE — 84132 ASSAY OF SERUM POTASSIUM: CPT

## 2022-01-11 PROCEDURE — 36415 COLL VENOUS BLD VENIPUNCTURE: CPT

## 2022-01-11 PROCEDURE — 82565 ASSAY OF CREATININE: CPT

## 2022-02-28 ENCOUNTER — HOSPITAL ENCOUNTER (OUTPATIENT)
Dept: LAB | Age: 32
Discharge: HOME | End: 2022-02-28
Payer: MEDICARE

## 2022-02-28 LAB
BUN BLDV-MCNC: 15 MG/DL (ref 6–20)
CHLORIDE BLD-SCNC: 106 MMOL/L (ref 98–107)
CO2: 24 MMOL/L (ref 20–31)
CREAT SERPL-MCNC: 0.67 MG/DL (ref 0.5–0.9)
GFR AFRICAN AMERICAN: >60 ML/MIN
GFR NON-AFRICAN AMERICAN: >60 ML/MIN
GFR SERPL CREATININE-BSD FRML MDRD: NORMAL ML/MIN/{1.73_M2}
INR BLD: 3
PARTIAL THROMBOPLASTIN TIME: 39.8 SEC (ref 23.9–33.8)
POTASSIUM SERPL-SCNC: 4.1 MMOL/L (ref 3.7–5.3)
PRO-BNP: 2120 PG/ML
PROTHROMBIN TIME: 29.3 SEC (ref 11.5–14.2)
SODIUM BLD-SCNC: 140 MMOL/L (ref 135–144)

## 2022-02-28 PROCEDURE — 82565 ASSAY OF CREATININE: CPT

## 2022-02-28 PROCEDURE — 83880 ASSAY OF NATRIURETIC PEPTIDE: CPT

## 2022-02-28 PROCEDURE — 82435 ASSAY OF BLOOD CHLORIDE: CPT

## 2022-02-28 PROCEDURE — 85730 THROMBOPLASTIN TIME PARTIAL: CPT

## 2022-02-28 PROCEDURE — 82374 ASSAY BLOOD CARBON DIOXIDE: CPT

## 2022-02-28 PROCEDURE — 84132 ASSAY OF SERUM POTASSIUM: CPT

## 2022-02-28 PROCEDURE — 85610 PROTHROMBIN TIME: CPT

## 2022-02-28 PROCEDURE — 84520 ASSAY OF UREA NITROGEN: CPT

## 2022-02-28 PROCEDURE — 36415 COLL VENOUS BLD VENIPUNCTURE: CPT

## 2022-02-28 PROCEDURE — 84295 ASSAY OF SERUM SODIUM: CPT

## 2022-05-13 ENCOUNTER — HOSPITAL ENCOUNTER (OUTPATIENT)
Dept: LAB | Age: 32
Discharge: HOME | End: 2022-05-13
Payer: MEDICARE

## 2022-05-13 LAB
BUN BLDV-MCNC: 17 MG/DL (ref 6–20)
CHLORIDE BLD-SCNC: 102 MMOL/L (ref 98–107)
CO2: 26 MMOL/L (ref 20–31)
CREAT SERPL-MCNC: 0.82 MG/DL (ref 0.5–0.9)
GFR AFRICAN AMERICAN: >60 ML/MIN
GFR NON-AFRICAN AMERICAN: >60 ML/MIN
GFR SERPL CREATININE-BSD FRML MDRD: NORMAL ML/MIN/{1.73_M2}
HEMOGLOBIN: 11.4 G/DL (ref 11.9–15.1)
INR BLD: 2
POTASSIUM SERPL-SCNC: 3.5 MMOL/L (ref 3.7–5.3)
PREALBUMIN: 14.8 MG/DL (ref 20–40)
PRO-BNP: 2280 PG/ML
PROTHROMBIN TIME: 21.4 SEC (ref 11.5–14.2)
SODIUM BLD-SCNC: 140 MMOL/L (ref 135–144)
URIC ACID: 4.8 MG/DL (ref 2.4–5.7)

## 2022-05-13 PROCEDURE — 36415 COLL VENOUS BLD VENIPUNCTURE: CPT

## 2022-05-13 PROCEDURE — 84520 ASSAY OF UREA NITROGEN: CPT

## 2022-05-13 PROCEDURE — 84550 ASSAY OF BLOOD/URIC ACID: CPT

## 2022-05-13 PROCEDURE — 84295 ASSAY OF SERUM SODIUM: CPT

## 2022-05-13 PROCEDURE — 82374 ASSAY BLOOD CARBON DIOXIDE: CPT

## 2022-05-13 PROCEDURE — 82435 ASSAY OF BLOOD CHLORIDE: CPT

## 2022-05-13 PROCEDURE — 85018 HEMOGLOBIN: CPT

## 2022-05-13 PROCEDURE — 85610 PROTHROMBIN TIME: CPT

## 2022-05-13 PROCEDURE — 84134 ASSAY OF PREALBUMIN: CPT

## 2022-05-13 PROCEDURE — 82565 ASSAY OF CREATININE: CPT

## 2022-05-13 PROCEDURE — 84132 ASSAY OF SERUM POTASSIUM: CPT

## 2022-05-13 PROCEDURE — 83880 ASSAY OF NATRIURETIC PEPTIDE: CPT

## 2022-08-05 ENCOUNTER — HOSPITAL ENCOUNTER (OUTPATIENT)
Dept: LAB | Age: 32
Discharge: HOME | End: 2022-08-05
Payer: MEDICARE

## 2022-08-05 LAB
BUN BLDV-MCNC: 29 MG/DL (ref 6–20)
CHLORIDE BLD-SCNC: 99 MMOL/L (ref 98–107)
CO2: 30 MMOL/L (ref 20–31)
CREAT SERPL-MCNC: 0.79 MG/DL (ref 0.5–0.9)
GFR AFRICAN AMERICAN: >60 ML/MIN
GFR NON-AFRICAN AMERICAN: >60 ML/MIN
GFR SERPL CREATININE-BSD FRML MDRD: NORMAL ML/MIN/{1.73_M2}
INR BLD: 2.2
PARTIAL THROMBOPLASTIN TIME: 41.9 SEC (ref 23.9–33.8)
POTASSIUM SERPL-SCNC: 3.4 MMOL/L (ref 3.7–5.3)
PROTHROMBIN TIME: 23.8 SEC (ref 11.5–14.2)
SODIUM BLD-SCNC: 138 MMOL/L (ref 135–144)

## 2022-08-05 PROCEDURE — 85610 PROTHROMBIN TIME: CPT

## 2022-08-05 PROCEDURE — 84295 ASSAY OF SERUM SODIUM: CPT

## 2022-08-05 PROCEDURE — 84520 ASSAY OF UREA NITROGEN: CPT

## 2022-08-05 PROCEDURE — 84132 ASSAY OF SERUM POTASSIUM: CPT

## 2022-08-05 PROCEDURE — 85730 THROMBOPLASTIN TIME PARTIAL: CPT

## 2022-08-05 PROCEDURE — 82565 ASSAY OF CREATININE: CPT

## 2022-08-05 PROCEDURE — 82374 ASSAY BLOOD CARBON DIOXIDE: CPT

## 2022-08-05 PROCEDURE — 36415 COLL VENOUS BLD VENIPUNCTURE: CPT

## 2022-08-05 PROCEDURE — 82435 ASSAY OF BLOOD CHLORIDE: CPT

## 2022-08-12 ENCOUNTER — HOSPITAL ENCOUNTER (OUTPATIENT)
Dept: LAB | Age: 32
Discharge: HOME | End: 2022-08-12
Payer: MEDICARE

## 2022-08-12 LAB
INR BLD: 1.7
PARTIAL THROMBOPLASTIN TIME: 35.6 SEC (ref 23.9–33.8)
PROTHROMBIN TIME: 19.3 SEC (ref 11.5–14.2)

## 2022-08-12 PROCEDURE — 85730 THROMBOPLASTIN TIME PARTIAL: CPT

## 2022-08-12 PROCEDURE — 36415 COLL VENOUS BLD VENIPUNCTURE: CPT

## 2022-08-12 PROCEDURE — 85610 PROTHROMBIN TIME: CPT

## 2022-09-06 ENCOUNTER — HOSPITAL ENCOUNTER (OUTPATIENT)
Dept: LAB | Age: 32
Discharge: HOME | End: 2022-09-06
Payer: MEDICARE

## 2022-09-06 LAB
INR BLD: 2.1
PROTHROMBIN TIME: 22.6 SEC (ref 11.5–14.2)

## 2022-09-06 PROCEDURE — 36415 COLL VENOUS BLD VENIPUNCTURE: CPT

## 2022-09-06 PROCEDURE — 85610 PROTHROMBIN TIME: CPT

## 2022-09-23 ENCOUNTER — HOSPITAL ENCOUNTER (OUTPATIENT)
Dept: LAB | Age: 32
Discharge: HOME | End: 2022-09-23
Payer: MEDICARE

## 2022-09-23 LAB
ABSOLUTE EOS #: 0.09 K/UL (ref 0–0.44)
ABSOLUTE IMMATURE GRANULOCYTE: 0.03 K/UL (ref 0–0.3)
ABSOLUTE LYMPH #: 0.96 K/UL (ref 1.1–3.7)
ABSOLUTE MONO #: 0.46 K/UL (ref 0.1–1.2)
BASOPHILS # BLD: 1 % (ref 0–2)
BASOPHILS ABSOLUTE: 0.03 K/UL (ref 0–0.2)
EOSINOPHILS RELATIVE PERCENT: 2 % (ref 1–4)
HCT VFR BLD CALC: 36.7 % (ref 36.3–47.1)
HEMOGLOBIN: 11.5 G/DL (ref 11.9–15.1)
IMMATURE GRANULOCYTES: 1 %
INR BLD: 2.4
LYMPHOCYTES # BLD: 18 % (ref 24–43)
MCH RBC QN AUTO: 27.9 PG (ref 25.2–33.5)
MCHC RBC AUTO-ENTMCNC: 31.3 G/DL (ref 25.2–33.5)
MCV RBC AUTO: 89.1 FL (ref 82.6–102.9)
MONOCYTES # BLD: 9 % (ref 3–12)
NRBC AUTOMATED: 0 PER 100 WBC
PDW BLD-RTO: 14.9 % (ref 11.8–14.4)
PLATELET # BLD: 121 K/UL (ref 138–453)
PMV BLD AUTO: 10.3 FL (ref 8.1–13.5)
PROTHROMBIN TIME: 25.2 SEC (ref 11.5–14.2)
RBC # BLD: 4.12 M/UL (ref 3.95–5.11)
RBC # BLD: ABNORMAL 10*6/UL
SEG NEUTROPHILS: 69 % (ref 36–65)
SEGMENTED NEUTROPHILS ABSOLUTE COUNT: 3.73 K/UL (ref 1.5–8.1)
WBC # BLD: 5.3 K/UL (ref 3.5–11.3)

## 2022-09-23 PROCEDURE — 85610 PROTHROMBIN TIME: CPT

## 2022-09-23 PROCEDURE — 85025 COMPLETE CBC W/AUTO DIFF WBC: CPT

## 2022-09-23 PROCEDURE — 36415 COLL VENOUS BLD VENIPUNCTURE: CPT

## 2022-10-11 ENCOUNTER — HOSPITAL ENCOUNTER (OUTPATIENT)
Dept: LAB | Age: 32
Discharge: HOME | End: 2022-10-11
Payer: MEDICARE

## 2022-10-11 LAB
BUN BLDV-MCNC: 19 MG/DL (ref 6–20)
CHLORIDE BLD-SCNC: 98 MMOL/L (ref 98–107)
CO2: 29 MMOL/L (ref 20–31)
CREAT SERPL-MCNC: 0.85 MG/DL (ref 0.5–0.9)
GFR SERPL CREATININE-BSD FRML MDRD: >60 ML/MIN/1.73M2
INR BLD: 2
MAGNESIUM: 1.9 MG/DL (ref 1.6–2.6)
POTASSIUM SERPL-SCNC: 3.9 MMOL/L (ref 3.7–5.3)
PROTHROMBIN TIME: 21.4 SEC (ref 11.5–14.2)
SODIUM BLD-SCNC: 138 MMOL/L (ref 135–144)

## 2022-10-11 PROCEDURE — 84132 ASSAY OF SERUM POTASSIUM: CPT

## 2022-10-11 PROCEDURE — 83735 ASSAY OF MAGNESIUM: CPT

## 2022-10-11 PROCEDURE — 84520 ASSAY OF UREA NITROGEN: CPT

## 2022-10-11 PROCEDURE — 84295 ASSAY OF SERUM SODIUM: CPT

## 2022-10-11 PROCEDURE — 82435 ASSAY OF BLOOD CHLORIDE: CPT

## 2022-10-11 PROCEDURE — 85610 PROTHROMBIN TIME: CPT

## 2022-10-11 PROCEDURE — 82374 ASSAY BLOOD CARBON DIOXIDE: CPT

## 2022-10-11 PROCEDURE — 36415 COLL VENOUS BLD VENIPUNCTURE: CPT

## 2022-10-11 PROCEDURE — 82565 ASSAY OF CREATININE: CPT

## 2022-10-17 ENCOUNTER — HOSPITAL ENCOUNTER (OUTPATIENT)
Dept: LAB | Age: 32
Discharge: HOME | End: 2022-10-17
Payer: MEDICARE

## 2022-10-17 LAB
INR BLD: 2.1
PROTHROMBIN TIME: 22.7 SEC (ref 11.5–14.2)

## 2022-10-17 PROCEDURE — 36415 COLL VENOUS BLD VENIPUNCTURE: CPT

## 2022-10-17 PROCEDURE — 85610 PROTHROMBIN TIME: CPT

## 2022-10-27 ENCOUNTER — HOSPITAL ENCOUNTER (OUTPATIENT)
Dept: LAB | Age: 32
Discharge: HOME | End: 2022-10-27
Payer: MEDICARE

## 2022-10-27 LAB
BUN BLDV-MCNC: 11 MG/DL (ref 6–20)
CHLORIDE BLD-SCNC: 96 MMOL/L (ref 98–107)
CO2: 33 MMOL/L (ref 20–31)
CREAT SERPL-MCNC: 0.73 MG/DL (ref 0.5–0.9)
GFR SERPL CREATININE-BSD FRML MDRD: >60 ML/MIN/1.73M2
INR BLD: 1.9
PARTIAL THROMBOPLASTIN TIME: 36.8 SEC (ref 23.9–33.8)
POTASSIUM SERPL-SCNC: 3.3 MMOL/L (ref 3.7–5.3)
PROTHROMBIN TIME: 20.7 SEC (ref 11.5–14.2)
SODIUM BLD-SCNC: 139 MMOL/L (ref 135–144)

## 2022-10-27 PROCEDURE — 84132 ASSAY OF SERUM POTASSIUM: CPT

## 2022-10-27 PROCEDURE — 84520 ASSAY OF UREA NITROGEN: CPT

## 2022-10-27 PROCEDURE — 82565 ASSAY OF CREATININE: CPT

## 2022-10-27 PROCEDURE — 85730 THROMBOPLASTIN TIME PARTIAL: CPT

## 2022-10-27 PROCEDURE — 85610 PROTHROMBIN TIME: CPT

## 2022-10-27 PROCEDURE — 36415 COLL VENOUS BLD VENIPUNCTURE: CPT

## 2022-10-27 PROCEDURE — 84295 ASSAY OF SERUM SODIUM: CPT

## 2022-10-27 PROCEDURE — 82435 ASSAY OF BLOOD CHLORIDE: CPT

## 2022-10-27 PROCEDURE — 82374 ASSAY BLOOD CARBON DIOXIDE: CPT

## 2022-11-10 ENCOUNTER — HOSPITAL ENCOUNTER (OUTPATIENT)
Dept: LAB | Age: 32
Discharge: HOME | End: 2022-11-10
Payer: MEDICARE

## 2022-11-10 LAB
INR BLD: 2.3
PROTHROMBIN TIME: 24 SEC (ref 11.5–14.2)

## 2022-11-10 PROCEDURE — 85610 PROTHROMBIN TIME: CPT

## 2022-11-10 PROCEDURE — 36415 COLL VENOUS BLD VENIPUNCTURE: CPT

## 2023-01-06 ENCOUNTER — HOSPITAL ENCOUNTER (OUTPATIENT)
Age: 33
Discharge: HOME | End: 2023-01-06
Payer: MEDICARE

## 2023-01-06 LAB — PARTIAL THROMBOPLASTIN TIME: 42.7 SEC (ref 23.9–33.8)

## 2023-01-06 PROCEDURE — 85730 THROMBOPLASTIN TIME PARTIAL: CPT

## 2023-01-06 PROCEDURE — 36415 COLL VENOUS BLD VENIPUNCTURE: CPT

## 2023-05-19 ENCOUNTER — HOSPITAL ENCOUNTER (OUTPATIENT)
Age: 33
Discharge: HOME | End: 2023-05-19
Payer: MEDICAID

## 2023-05-19 LAB
BASOPHILS # BLD: <0.03 K/UL (ref 0–0.2)
BASOPHILS NFR BLD: 1 % (ref 0–2)
BUN SERPL-MCNC: 11 MG/DL (ref 6–20)
CHLORIDE SERPL-SCNC: 100 MMOL/L (ref 98–107)
CO2 SERPL-SCNC: 29 MMOL/L (ref 20–31)
CREAT SERPL-MCNC: 0.78 MG/DL (ref 0.5–0.9)
EOSINOPHIL # BLD: 0.03 K/UL (ref 0–0.44)
EOSINOPHILS RELATIVE PERCENT: 1 % (ref 1–4)
ERYTHROCYTE [DISTWIDTH] IN BLOOD BY AUTOMATED COUNT: 13.8 % (ref 11.8–14.4)
GFR SERPL CREATININE-BSD FRML MDRD: >60 ML/MIN/1.73M2
HCT VFR BLD AUTO: 38.4 % (ref 36.3–47.1)
HGB BLD-MCNC: 12.3 G/DL (ref 11.9–15.1)
IMM GRANULOCYTES # BLD AUTO: <0.03 K/UL (ref 0–0.3)
IMM GRANULOCYTES NFR BLD: 0 %
INR PPP: 4.2
LDH SERPL-CCNC: 211 U/L (ref 135–214)
LYMPHOCYTES # BLD: 27 % (ref 24–43)
LYMPHOCYTES NFR BLD: 0.93 K/UL (ref 1.1–3.7)
MCH RBC QN AUTO: 29.7 PG (ref 25.2–33.5)
MCHC RBC AUTO-ENTMCNC: 32 G/DL (ref 25.2–33.5)
MCV RBC AUTO: 92.8 FL (ref 82.6–102.9)
MONOCYTES NFR BLD: 0.34 K/UL (ref 0.1–1.2)
MONOCYTES NFR BLD: 10 % (ref 3–12)
NEUTROPHILS NFR BLD: 62 % (ref 36–65)
NEUTS SEG NFR BLD: 2.16 K/UL (ref 1.5–8.1)
NRBC AUTOMATED: 0 PER 100 WBC
PLATELET # BLD AUTO: ABNORMAL K/UL (ref 138–453)
PLATELET, FLUORESCENCE: 97 K/UL (ref 138–453)
PLATELETS.RETICULATED NFR BLD AUTO: 4.7 % (ref 1.1–10.3)
POTASSIUM SERPL-SCNC: 3.2 MMOL/L (ref 3.7–5.3)
PROTHROMBIN TIME: 38.9 SEC (ref 11.5–14.2)
RBC # BLD AUTO: 4.14 M/UL (ref 3.95–5.11)
SODIUM SERPL-SCNC: 140 MMOL/L (ref 135–144)
WBC OTHER # BLD: 3.5 K/UL (ref 3.5–11.3)

## 2023-05-19 PROCEDURE — 83615 LACTATE (LD) (LDH) ENZYME: CPT

## 2023-05-19 PROCEDURE — 36415 COLL VENOUS BLD VENIPUNCTURE: CPT

## 2023-05-19 PROCEDURE — 84295 ASSAY OF SERUM SODIUM: CPT

## 2023-05-19 PROCEDURE — 82435 ASSAY OF BLOOD CHLORIDE: CPT

## 2023-05-19 PROCEDURE — 82374 ASSAY BLOOD CARBON DIOXIDE: CPT

## 2023-05-19 PROCEDURE — 82565 ASSAY OF CREATININE: CPT

## 2023-05-19 PROCEDURE — 85025 COMPLETE CBC W/AUTO DIFF WBC: CPT

## 2023-05-19 PROCEDURE — 84132 ASSAY OF SERUM POTASSIUM: CPT

## 2023-05-19 PROCEDURE — 84520 ASSAY OF UREA NITROGEN: CPT

## 2023-05-19 PROCEDURE — 85610 PROTHROMBIN TIME: CPT

## 2023-07-06 RX ORDER — MULTIVIT-MIN/IRON FUM/FOLIC AC 7.5 MG-4
TABLET ORAL
Qty: 240 TABLET | OUTPATIENT
Start: 2023-07-06

## 2023-08-21 ENCOUNTER — HOSPITAL ENCOUNTER (OUTPATIENT)
Age: 33
Discharge: HOME | End: 2023-08-21
Payer: MEDICAID

## 2023-08-21 LAB
ALBUMIN SERPL-MCNC: 4 G/DL (ref 3.5–5.2)
ALP SERPL-CCNC: 78 U/L (ref 35–104)
ALT SERPL-CCNC: 19 U/L (ref 5–33)
AST SERPL-CCNC: 30 U/L
BASOPHILS # BLD: 0.03 K/UL (ref 0–0.2)
BASOPHILS NFR BLD: 1 % (ref 0–2)
BILIRUB DIRECT SERPL-MCNC: 0.3 MG/DL
BILIRUB SERPL-MCNC: 1.3 MG/DL (ref 0.3–1.2)
BUN SERPL-MCNC: 15 MG/DL (ref 6–20)
CALCIUM SERPL-MCNC: 9.4 MG/DL (ref 8.6–10.4)
CHLORIDE SERPL-SCNC: 101 MMOL/L (ref 98–107)
CO2 SERPL-SCNC: 30 MMOL/L (ref 20–31)
CREAT SERPL-MCNC: 0.7 MG/DL (ref 0.5–0.9)
EOSINOPHIL # BLD: 0.08 K/UL (ref 0–0.44)
EOSINOPHILS RELATIVE PERCENT: 2 % (ref 1–4)
ERYTHROCYTE [DISTWIDTH] IN BLOOD BY AUTOMATED COUNT: 15.8 % (ref 11.8–14.4)
GFR SERPL CREATININE-BSD FRML MDRD: >60 ML/MIN/1.73M2
GLUCOSE SERPL-MCNC: 89 MG/DL (ref 70–99)
HCT VFR BLD AUTO: 37.9 % (ref 36.3–47.1)
HGB BLD-MCNC: 11.6 G/DL (ref 11.9–15.1)
IMM GRANULOCYTES # BLD AUTO: <0.03 K/UL (ref 0–0.3)
IMM GRANULOCYTES NFR BLD: 0 %
INR PPP: 2.4
LYMPHOCYTES NFR BLD: 0.97 K/UL (ref 1.1–3.7)
LYMPHOCYTES RELATIVE PERCENT: 23 % (ref 24–43)
MCH RBC QN AUTO: 27.6 PG (ref 25.2–33.5)
MCHC RBC AUTO-ENTMCNC: 30.6 G/DL (ref 25.2–33.5)
MCV RBC AUTO: 90 FL (ref 82.6–102.9)
MONOCYTES NFR BLD: 0.34 K/UL (ref 0.1–1.2)
MONOCYTES NFR BLD: 8 % (ref 3–12)
NEUTROPHILS NFR BLD: 66 % (ref 36–65)
NEUTS SEG NFR BLD: 2.77 K/UL (ref 1.5–8.1)
NRBC BLD-RTO: 0 PER 100 WBC
PARTIAL THROMBOPLASTIN TIME: 38.8 SEC (ref 23.9–33.8)
PLATELET # BLD AUTO: 141 K/UL (ref 138–453)
PMV BLD AUTO: 10.2 FL (ref 8.1–13.5)
POTASSIUM SERPL-SCNC: 3.6 MMOL/L (ref 3.7–5.3)
PROTHROMBIN TIME: 25.3 SEC (ref 11.5–14.2)
RBC # BLD AUTO: 4.21 M/UL (ref 3.95–5.11)
RBC # BLD: ABNORMAL 10*6/UL
SODIUM SERPL-SCNC: 139 MMOL/L (ref 135–144)
WBC OTHER # BLD: 4.2 K/UL (ref 3.5–11.3)

## 2023-08-21 PROCEDURE — 84132 ASSAY OF SERUM POTASSIUM: CPT

## 2023-08-21 PROCEDURE — 82565 ASSAY OF CREATININE: CPT

## 2023-08-21 PROCEDURE — 82247 BILIRUBIN TOTAL: CPT

## 2023-08-21 PROCEDURE — 85730 THROMBOPLASTIN TIME PARTIAL: CPT

## 2023-08-21 PROCEDURE — 82310 ASSAY OF CALCIUM: CPT

## 2023-08-21 PROCEDURE — 82374 ASSAY BLOOD CARBON DIOXIDE: CPT

## 2023-08-21 PROCEDURE — 84450 TRANSFERASE (AST) (SGOT): CPT

## 2023-08-21 PROCEDURE — 84520 ASSAY OF UREA NITROGEN: CPT

## 2023-08-21 PROCEDURE — 82435 ASSAY OF BLOOD CHLORIDE: CPT

## 2023-08-21 PROCEDURE — 85610 PROTHROMBIN TIME: CPT

## 2023-08-21 PROCEDURE — 84075 ASSAY ALKALINE PHOSPHATASE: CPT

## 2023-08-21 PROCEDURE — 82947 ASSAY GLUCOSE BLOOD QUANT: CPT

## 2023-08-21 PROCEDURE — 36415 COLL VENOUS BLD VENIPUNCTURE: CPT

## 2023-08-21 PROCEDURE — 82040 ASSAY OF SERUM ALBUMIN: CPT

## 2023-08-21 PROCEDURE — 85025 COMPLETE CBC W/AUTO DIFF WBC: CPT

## 2023-08-21 PROCEDURE — 82248 BILIRUBIN DIRECT: CPT

## 2023-08-21 PROCEDURE — 84295 ASSAY OF SERUM SODIUM: CPT

## 2023-08-21 PROCEDURE — 84460 ALANINE AMINO (ALT) (SGPT): CPT

## 2023-10-04 ENCOUNTER — HOSPITAL ENCOUNTER (OUTPATIENT)
Age: 33
Discharge: HOME | End: 2023-10-04
Payer: MEDICAID

## 2023-10-04 LAB
BUN SERPL-MCNC: 12 MG/DL (ref 6–20)
CHLORIDE SERPL-SCNC: 101 MMOL/L (ref 98–107)
CO2 SERPL-SCNC: 27 MMOL/L (ref 20–31)
CREAT SERPL-MCNC: 0.9 MG/DL (ref 0.5–0.9)
GFR SERPL CREATININE-BSD FRML MDRD: >60 ML/MIN/1.73M2
POTASSIUM SERPL-SCNC: 4.3 MMOL/L (ref 3.7–5.3)
SODIUM SERPL-SCNC: 140 MMOL/L (ref 135–144)

## 2023-10-04 PROCEDURE — 82435 ASSAY OF BLOOD CHLORIDE: CPT

## 2023-10-04 PROCEDURE — 82565 ASSAY OF CREATININE: CPT

## 2023-10-04 PROCEDURE — 84132 ASSAY OF SERUM POTASSIUM: CPT

## 2023-10-04 PROCEDURE — 36415 COLL VENOUS BLD VENIPUNCTURE: CPT

## 2023-10-04 PROCEDURE — 84520 ASSAY OF UREA NITROGEN: CPT

## 2023-10-04 PROCEDURE — 84295 ASSAY OF SERUM SODIUM: CPT

## 2023-10-04 PROCEDURE — 82374 ASSAY BLOOD CARBON DIOXIDE: CPT

## 2023-10-26 ENCOUNTER — HOSPITAL ENCOUNTER (OUTPATIENT)
Age: 33
Discharge: HOME | End: 2023-10-26
Payer: MEDICAID

## 2023-10-26 LAB
ALBUMIN SERPL-MCNC: 4.5 G/DL (ref 3.5–5.2)
ALP SERPL-CCNC: 104 U/L (ref 35–104)
ALT SERPL-CCNC: 21 U/L (ref 5–33)
ANION GAP SERPL CALCULATED.3IONS-SCNC: 12 MMOL/L (ref 9–17)
AST SERPL-CCNC: 28 U/L
BASOPHILS # BLD: <0.03 K/UL (ref 0–0.2)
BASOPHILS NFR BLD: 0 % (ref 0–2)
BILIRUB DIRECT SERPL-MCNC: 0.3 MG/DL
BILIRUB SERPL-MCNC: 1 MG/DL (ref 0.3–1.2)
BUN SERPL-MCNC: 19 MG/DL (ref 6–20)
BUN/CREAT SERPL: 24 (ref 9–20)
CALCIUM SERPL-MCNC: 9.7 MG/DL (ref 8.6–10.4)
CHLORIDE SERPL-SCNC: 96 MMOL/L (ref 98–107)
CO2 SERPL-SCNC: 27 MMOL/L (ref 20–31)
CREAT SERPL-MCNC: 0.8 MG/DL (ref 0.5–0.9)
EOSINOPHIL # BLD: 0.04 K/UL (ref 0–0.44)
EOSINOPHILS RELATIVE PERCENT: 1 % (ref 1–4)
ERYTHROCYTE [DISTWIDTH] IN BLOOD BY AUTOMATED COUNT: 16.9 % (ref 11.8–14.4)
GFR SERPL CREATININE-BSD FRML MDRD: >60 ML/MIN/1.73M2
GLUCOSE SERPL-MCNC: 79 MG/DL (ref 70–99)
HCT VFR BLD AUTO: 38.9 % (ref 36.3–47.1)
HGB BLD-MCNC: 12.1 G/DL (ref 11.9–15.1)
IMM GRANULOCYTES # BLD AUTO: 0.03 K/UL (ref 0–0.3)
IMM GRANULOCYTES NFR BLD: 1 %
INR PPP: 2.4
LYMPHOCYTES NFR BLD: 0.83 K/UL (ref 1.1–3.7)
LYMPHOCYTES RELATIVE PERCENT: 16 % (ref 24–43)
MCH RBC QN AUTO: 26.1 PG (ref 25.2–33.5)
MCHC RBC AUTO-ENTMCNC: 31.1 G/DL (ref 25.2–33.5)
MCV RBC AUTO: 83.8 FL (ref 82.6–102.9)
MONOCYTES NFR BLD: 0.46 K/UL (ref 0.1–1.2)
MONOCYTES NFR BLD: 9 % (ref 3–12)
NEUTROPHILS NFR BLD: 73 % (ref 36–65)
NEUTS SEG NFR BLD: 3.81 K/UL (ref 1.5–8.1)
NRBC BLD-RTO: 0 PER 100 WBC
PARTIAL THROMBOPLASTIN TIME: 40.2 SEC (ref 23.9–33.8)
PLATELET # BLD AUTO: 143 K/UL (ref 138–453)
PMV BLD AUTO: 10.2 FL (ref 8.1–13.5)
POTASSIUM SERPL-SCNC: 4.2 MMOL/L (ref 3.7–5.3)
PROTHROMBIN TIME: 25 SEC (ref 11.5–14.2)
RBC # BLD AUTO: 4.64 M/UL (ref 3.95–5.11)
RBC # BLD: ABNORMAL 10*6/UL
SODIUM SERPL-SCNC: 135 MMOL/L (ref 135–144)
WBC OTHER # BLD: 5.2 K/UL (ref 3.5–11.3)

## 2023-10-26 PROCEDURE — 84460 ALANINE AMINO (ALT) (SGPT): CPT

## 2023-10-26 PROCEDURE — 84450 TRANSFERASE (AST) (SGOT): CPT

## 2023-10-26 PROCEDURE — 82247 BILIRUBIN TOTAL: CPT

## 2023-10-26 PROCEDURE — 85025 COMPLETE CBC W/AUTO DIFF WBC: CPT

## 2023-10-26 PROCEDURE — 85730 THROMBOPLASTIN TIME PARTIAL: CPT

## 2023-10-26 PROCEDURE — 82040 ASSAY OF SERUM ALBUMIN: CPT

## 2023-10-26 PROCEDURE — 80048 BASIC METABOLIC PNL TOTAL CA: CPT

## 2023-10-26 PROCEDURE — 84075 ASSAY ALKALINE PHOSPHATASE: CPT

## 2023-10-26 PROCEDURE — 82248 BILIRUBIN DIRECT: CPT

## 2023-10-26 PROCEDURE — 85610 PROTHROMBIN TIME: CPT

## 2023-10-26 PROCEDURE — 36415 COLL VENOUS BLD VENIPUNCTURE: CPT

## 2023-11-18 ENCOUNTER — HOSPITAL ENCOUNTER (OUTPATIENT)
Age: 33
Discharge: HOME | End: 2023-11-18
Payer: MEDICAID

## 2023-11-18 LAB
BASOPHILS # BLD: 0.03 K/UL (ref 0–0.2)
BASOPHILS NFR BLD: 1 % (ref 0–2)
BNP SERPL-MCNC: 1198 PG/ML
BUN SERPL-MCNC: 14 MG/DL (ref 6–20)
CHLORIDE SERPL-SCNC: 99 MMOL/L (ref 98–107)
CO2 SERPL-SCNC: 26 MMOL/L (ref 20–31)
CREAT SERPL-MCNC: 0.9 MG/DL (ref 0.5–0.9)
EOSINOPHIL # BLD: 0.07 K/UL (ref 0–0.44)
EOSINOPHILS RELATIVE PERCENT: 2 % (ref 1–4)
ERYTHROCYTE [DISTWIDTH] IN BLOOD BY AUTOMATED COUNT: 16.5 % (ref 11.8–14.4)
GFR SERPL CREATININE-BSD FRML MDRD: >60 ML/MIN/1.73M2
HCT VFR BLD AUTO: 38.3 % (ref 36.3–47.1)
HGB BLD-MCNC: 11.8 G/DL (ref 11.9–15.1)
IMM GRANULOCYTES # BLD AUTO: <0.03 K/UL (ref 0–0.3)
IMM GRANULOCYTES NFR BLD: 0 %
LDH SERPL-CCNC: 179 U/L (ref 135–214)
LYMPHOCYTES NFR BLD: 1.03 K/UL (ref 1.1–3.7)
LYMPHOCYTES RELATIVE PERCENT: 23 % (ref 24–43)
MCH RBC QN AUTO: 26.5 PG (ref 25.2–33.5)
MCHC RBC AUTO-ENTMCNC: 30.8 G/DL (ref 25.2–33.5)
MCV RBC AUTO: 86.1 FL (ref 82.6–102.9)
MONOCYTES NFR BLD: 0.49 K/UL (ref 0.1–1.2)
MONOCYTES NFR BLD: 11 % (ref 3–12)
NEUTROPHILS NFR BLD: 63 % (ref 36–65)
NEUTS SEG NFR BLD: 2.88 K/UL (ref 1.5–8.1)
NRBC BLD-RTO: 0 PER 100 WBC
PLATELET # BLD AUTO: 103 K/UL (ref 138–453)
PMV BLD AUTO: 10.5 FL (ref 8.1–13.5)
POTASSIUM SERPL-SCNC: 4.1 MMOL/L (ref 3.7–5.3)
RBC # BLD AUTO: 4.45 M/UL (ref 3.95–5.11)
RBC # BLD: ABNORMAL 10*6/UL
SODIUM SERPL-SCNC: 136 MMOL/L (ref 135–144)
WBC OTHER # BLD: 4.5 K/UL (ref 3.5–11.3)

## 2023-11-18 PROCEDURE — 85025 COMPLETE CBC W/AUTO DIFF WBC: CPT

## 2023-11-18 PROCEDURE — 83615 LACTATE (LD) (LDH) ENZYME: CPT

## 2023-11-18 PROCEDURE — 84295 ASSAY OF SERUM SODIUM: CPT

## 2023-11-18 PROCEDURE — 84520 ASSAY OF UREA NITROGEN: CPT

## 2023-11-18 PROCEDURE — 84132 ASSAY OF SERUM POTASSIUM: CPT

## 2023-11-18 PROCEDURE — 83880 ASSAY OF NATRIURETIC PEPTIDE: CPT

## 2023-11-18 PROCEDURE — 82435 ASSAY OF BLOOD CHLORIDE: CPT

## 2023-11-18 PROCEDURE — 36415 COLL VENOUS BLD VENIPUNCTURE: CPT

## 2023-11-18 PROCEDURE — 82565 ASSAY OF CREATININE: CPT

## 2023-11-18 PROCEDURE — 82374 ASSAY BLOOD CARBON DIOXIDE: CPT

## 2023-11-20 DIAGNOSIS — Q21.3 TOF (TETRALOGY OF FALLOT): Chronic | ICD-10-CM

## 2023-11-20 RX ORDER — MULTIVITAMIN
1 TABLET ORAL DAILY
Qty: 30 TABLET | Refills: 11 | OUTPATIENT
Start: 2023-11-20

## 2024-01-18 ENCOUNTER — HOSPITAL ENCOUNTER (OUTPATIENT)
Age: 34
Discharge: HOME | End: 2024-01-18
Payer: MEDICAID

## 2024-01-18 LAB
ALBUMIN SERPL-MCNC: 3.7 G/DL (ref 3.5–5.2)
ALP SERPL-CCNC: 94 U/L (ref 35–104)
ALT SERPL-CCNC: 15 U/L (ref 5–33)
ANION GAP SERPL CALCULATED.3IONS-SCNC: 9 MMOL/L (ref 9–17)
AST SERPL-CCNC: 26 U/L
BASOPHILS # BLD: 0.04 K/UL (ref 0–0.2)
BASOPHILS NFR BLD: 1 % (ref 0–2)
BILIRUB DIRECT SERPL-MCNC: 0.2 MG/DL
BILIRUB SERPL-MCNC: 0.8 MG/DL (ref 0.3–1.2)
BUN SERPL-MCNC: 22 MG/DL (ref 6–20)
BUN/CREAT SERPL: 24 (ref 9–20)
CALCIUM SERPL-MCNC: 8.7 MG/DL (ref 8.6–10.4)
CHLORIDE SERPL-SCNC: 101 MMOL/L (ref 98–107)
CO2 SERPL-SCNC: 27 MMOL/L (ref 20–31)
CREAT SERPL-MCNC: 0.9 MG/DL (ref 0.5–0.9)
EOSINOPHIL # BLD: <0.03 K/UL (ref 0–0.44)
EOSINOPHILS RELATIVE PERCENT: 0 % (ref 1–4)
ERYTHROCYTE [DISTWIDTH] IN BLOOD BY AUTOMATED COUNT: 16 % (ref 11.8–14.4)
GFR SERPL CREATININE-BSD FRML MDRD: >60 ML/MIN/1.73M2
GLUCOSE SERPL-MCNC: 132 MG/DL (ref 70–99)
HCT VFR BLD AUTO: 37 % (ref 36.3–47.1)
HGB BLD-MCNC: 11.3 G/DL (ref 11.9–15.1)
IMM GRANULOCYTES # BLD AUTO: <0.03 K/UL (ref 0–0.3)
IMM GRANULOCYTES NFR BLD: 0 %
INR PPP: 1.7
LYMPHOCYTES NFR BLD: 0.82 K/UL (ref 1.1–3.7)
LYMPHOCYTES RELATIVE PERCENT: 15 % (ref 24–43)
MCH RBC QN AUTO: 26.2 PG (ref 25.2–33.5)
MCHC RBC AUTO-ENTMCNC: 30.5 G/DL (ref 25.2–33.5)
MCV RBC AUTO: 85.6 FL (ref 82.6–102.9)
MONOCYTES NFR BLD: 0.44 K/UL (ref 0.1–1.2)
MONOCYTES NFR BLD: 8 % (ref 3–12)
NEUTROPHILS NFR BLD: 76 % (ref 36–65)
NEUTS SEG NFR BLD: 4.28 K/UL (ref 1.5–8.1)
NRBC BLD-RTO: 0 PER 100 WBC
PARTIAL THROMBOPLASTIN TIME: 34 SEC (ref 23.9–33.8)
PLATELET # BLD AUTO: 169 K/UL (ref 138–453)
PMV BLD AUTO: 10.4 FL (ref 8.1–13.5)
POTASSIUM SERPL-SCNC: 4.6 MMOL/L (ref 3.7–5.3)
PROTHROMBIN TIME: 19.3 SEC (ref 11.5–14.2)
RBC # BLD AUTO: 4.32 M/UL (ref 3.95–5.11)
RBC # BLD: ABNORMAL 10*6/UL
SODIUM SERPL-SCNC: 137 MMOL/L (ref 135–144)
WBC OTHER # BLD: 5.6 K/UL (ref 3.5–11.3)

## 2024-01-18 PROCEDURE — 85025 COMPLETE CBC W/AUTO DIFF WBC: CPT

## 2024-01-18 PROCEDURE — 85610 PROTHROMBIN TIME: CPT

## 2024-01-18 PROCEDURE — 82040 ASSAY OF SERUM ALBUMIN: CPT

## 2024-01-18 PROCEDURE — 36415 COLL VENOUS BLD VENIPUNCTURE: CPT

## 2024-01-18 PROCEDURE — 84450 TRANSFERASE (AST) (SGOT): CPT

## 2024-01-18 PROCEDURE — 85730 THROMBOPLASTIN TIME PARTIAL: CPT

## 2024-01-18 PROCEDURE — 84075 ASSAY ALKALINE PHOSPHATASE: CPT

## 2024-01-18 PROCEDURE — 84460 ALANINE AMINO (ALT) (SGPT): CPT

## 2024-01-18 PROCEDURE — 82248 BILIRUBIN DIRECT: CPT

## 2024-01-18 PROCEDURE — 80048 BASIC METABOLIC PNL TOTAL CA: CPT

## 2024-01-18 PROCEDURE — 82247 BILIRUBIN TOTAL: CPT

## 2024-01-31 ENCOUNTER — HOSPITAL ENCOUNTER (OUTPATIENT)
Age: 34
Discharge: HOME | End: 2024-01-31
Payer: MEDICAID

## 2024-01-31 LAB
ALBUMIN SERPL-MCNC: 3.7 G/DL (ref 3.5–5.2)
ALP SERPL-CCNC: 88 U/L (ref 35–104)
ALT SERPL-CCNC: 18 U/L (ref 5–33)
ANION GAP SERPL CALCULATED.3IONS-SCNC: 8 MMOL/L (ref 9–17)
AST SERPL-CCNC: 28 U/L
BASOPHILS # BLD: 0.04 K/UL (ref 0–0.2)
BASOPHILS NFR BLD: 1 % (ref 0–2)
BILIRUB DIRECT SERPL-MCNC: 0.2 MG/DL
BILIRUB SERPL-MCNC: 0.7 MG/DL (ref 0.3–1.2)
BUN SERPL-MCNC: 20 MG/DL (ref 6–20)
BUN/CREAT SERPL: 25 (ref 9–20)
CALCIUM SERPL-MCNC: 8.8 MG/DL (ref 8.6–10.4)
CHLORIDE SERPL-SCNC: 100 MMOL/L (ref 98–107)
CO2 SERPL-SCNC: 27 MMOL/L (ref 20–31)
CREAT SERPL-MCNC: 0.8 MG/DL (ref 0.5–0.9)
EOSINOPHIL # BLD: 0.05 K/UL (ref 0–0.44)
EOSINOPHILS RELATIVE PERCENT: 1 % (ref 1–4)
ERYTHROCYTE [DISTWIDTH] IN BLOOD BY AUTOMATED COUNT: 16 % (ref 11.8–14.4)
GFR SERPL CREATININE-BSD FRML MDRD: >60 ML/MIN/1.73M2
GLUCOSE SERPL-MCNC: 79 MG/DL (ref 70–99)
HCT VFR BLD AUTO: 35.9 % (ref 36.3–47.1)
HGB BLD-MCNC: 10.8 G/DL (ref 11.9–15.1)
IMM GRANULOCYTES # BLD AUTO: <0.03 K/UL (ref 0–0.3)
IMM GRANULOCYTES NFR BLD: 0 %
INR PPP: 2.7
LYMPHOCYTES NFR BLD: 0.72 K/UL (ref 1.1–3.7)
LYMPHOCYTES RELATIVE PERCENT: 16 % (ref 24–43)
MCH RBC QN AUTO: 25.5 PG (ref 25.2–33.5)
MCHC RBC AUTO-ENTMCNC: 30.1 G/DL (ref 25.2–33.5)
MCV RBC AUTO: 84.9 FL (ref 82.6–102.9)
MONOCYTES NFR BLD: 0.48 K/UL (ref 0.1–1.2)
MONOCYTES NFR BLD: 11 % (ref 3–12)
NEUTROPHILS NFR BLD: 71 % (ref 36–65)
NEUTS SEG NFR BLD: 3.16 K/UL (ref 1.5–8.1)
NRBC BLD-RTO: 0 PER 100 WBC
PARTIAL THROMBOPLASTIN TIME: 38.7 SEC (ref 23.9–33.8)
PLATELET # BLD AUTO: 173 K/UL (ref 138–453)
PMV BLD AUTO: 9.8 FL (ref 8.1–13.5)
POTASSIUM SERPL-SCNC: 4.2 MMOL/L (ref 3.7–5.3)
PROTHROMBIN TIME: 28.1 SEC (ref 11.5–14.2)
RBC # BLD AUTO: 4.23 M/UL (ref 3.95–5.11)
RBC # BLD: ABNORMAL 10*6/UL
SODIUM SERPL-SCNC: 135 MMOL/L (ref 135–144)
WBC OTHER # BLD: 4.5 K/UL (ref 3.5–11.3)

## 2024-01-31 PROCEDURE — 82248 BILIRUBIN DIRECT: CPT

## 2024-01-31 PROCEDURE — 84075 ASSAY ALKALINE PHOSPHATASE: CPT

## 2024-01-31 PROCEDURE — 85730 THROMBOPLASTIN TIME PARTIAL: CPT

## 2024-01-31 PROCEDURE — 85610 PROTHROMBIN TIME: CPT

## 2024-01-31 PROCEDURE — 82040 ASSAY OF SERUM ALBUMIN: CPT

## 2024-01-31 PROCEDURE — 85025 COMPLETE CBC W/AUTO DIFF WBC: CPT

## 2024-01-31 PROCEDURE — 84460 ALANINE AMINO (ALT) (SGPT): CPT

## 2024-01-31 PROCEDURE — 36415 COLL VENOUS BLD VENIPUNCTURE: CPT

## 2024-01-31 PROCEDURE — 84450 TRANSFERASE (AST) (SGOT): CPT

## 2024-01-31 PROCEDURE — 82247 BILIRUBIN TOTAL: CPT

## 2024-01-31 PROCEDURE — 80048 BASIC METABOLIC PNL TOTAL CA: CPT
